# Patient Record
Sex: MALE
[De-identification: names, ages, dates, MRNs, and addresses within clinical notes are randomized per-mention and may not be internally consistent; named-entity substitution may affect disease eponyms.]

---

## 2019-10-08 PROBLEM — Z00.00 ENCOUNTER FOR PREVENTIVE HEALTH EXAMINATION: Status: ACTIVE | Noted: 2019-10-08

## 2019-10-09 ENCOUNTER — APPOINTMENT (OUTPATIENT)
Dept: ORTHOPEDIC SURGERY | Facility: CLINIC | Age: 64
End: 2019-10-09
Payer: COMMERCIAL

## 2019-10-09 DIAGNOSIS — M23.90 UNSPECIFIED INTERNAL DERANGEMENT OF UNSPECIFIED KNEE: ICD-10-CM

## 2019-10-09 PROCEDURE — 99203 OFFICE O/P NEW LOW 30 MIN: CPT

## 2019-10-09 PROCEDURE — 73562 X-RAY EXAM OF KNEE 3: CPT | Mod: LT

## 2019-10-09 NOTE — HISTORY OF PRESENT ILLNESS
[de-identified] : Patient reports that the LEFT knee has been bothering him for a bout 1 week now, atraumatic. He reports that his symptoms were made worse after a spin class. Dull aching pain. Pain is intermittent. Clicking.  States no significant improvement with rest and activity modification

## 2019-10-09 NOTE — ASSESSMENT
[FreeTextEntry1] : Patient has previously tried rest, activity modification, exercises, and medications (ie. anti-inflammatories, such as Ibuprofen or Tylenol) without improvement.  Patient has previously had x-ray evaluation.  Given persistent symptoms, a formal request is made for an MRI.\par

## 2019-10-09 NOTE — PHYSICAL EXAM
[de-identified] : Left knee\par \par Constitutional: \par The patient is healthy-appearing and in no apparent distress. \par \par Gait:\par The patient ambulates with a normal gait and no limp.\par \par Cardiovascular System: \par The capillary refill is less than 2 seconds. \par \par Skin: \par There are no skin abnormalities.\par \par Left Knee:\par  \par Bony Palpation: \par There is TENDERNESS of the medial joint line. \par There is no tenderness of the lateral joint line.\par There is no tenderness of the medial femoral chondyle.\par There is no tenderness of the lateral femoral chondyle.\par There is no tenderness of the tibial tubercle.\par There is no tenderness of the superior patella.\par There is no tenderness of the inferior patella.\par There is no tenderness of the medial patellar facet.\par There is no tenderness of the lateral patellar facet.\par \par Soft Tissue Palpation: \par There is no tenderness of the medial retinaculum.\par There is no tenderness of the lateral retinaculum.\par There is no tenderness of the quadriceps tendon.\par There is no tenderness of the patella tendon.\par There is no tenderness of the ITB.\par There is no tenderness of the pes anserine.\par \par Active Range of Motion: \par The range of motion at the knee actively and passively is full. \par \par Special Tests: \par There is a POSITIVE Apley.\par There is a POSITIVE Steinmanns. \par There is a negative Lachman and Anterior Drawer.\par There is a negative Posterior Drawer.  \par There is no varus or valgus laxity.\par \par Strength: \par There is 5/5 hip flexion and 5/5 knee flexion and extension.  \par \par Psychiatric: \par The patient demonstrates a normal mood and affect and is active and alert [de-identified] : X-ray left knee. There is no significant bone abnormality arthritis or fracture\par \par

## 2019-11-22 ENCOUNTER — APPOINTMENT (OUTPATIENT)
Dept: ORTHOPEDIC SURGERY | Facility: CLINIC | Age: 64
End: 2019-11-22
Payer: COMMERCIAL

## 2019-11-22 VITALS — BODY MASS INDEX: 25.92 KG/M2 | WEIGHT: 175 LBS | HEIGHT: 69 IN

## 2019-11-22 DIAGNOSIS — S59.912A UNSPECIFIED INJURY OF LEFT FOREARM, INITIAL ENCOUNTER: ICD-10-CM

## 2019-11-22 PROCEDURE — 99215 OFFICE O/P EST HI 40 MIN: CPT

## 2019-11-22 PROCEDURE — 73090 X-RAY EXAM OF FOREARM: CPT | Mod: LT

## 2019-11-22 NOTE — PHYSICAL EXAM
[de-identified] : General: No acute distress, conversant, well-nourished.\par Head: Normocephalic, atraumatic\par Neck: trachea midline, FROM\par Heart: normotensive and normal rate and rhythm\par Lungs: No labored breathing\par Skin: No abrasions, no rashes, no edema\par Psych: Alert and oriented to person, place and time\par Extremities: no peripheral edema or digital cyanosis\par Gait: Normal gait.   \par Vascular: warm and well perfused distally, palpable distal pulses\par \par MSK:\par Left Upper Extremity\par skin intact\par mild tenderness in dorsal proximal forearm\par compartments soft and compressible\par mild discomfort with ROM of elbow: flexion, extension, supination and pronation\par no block to motion\par SILT median/radial/ulnar distributions\par +Motor EPL/FPL/EDC/FDP/IO\par WWP distally [de-identified] : Left elbow obtained in the office today shows no acute fracture or dislocation.  \par Left forearm obtained in the office today shows no acute fracture or dislocation.  \par

## 2019-11-22 NOTE — ASSESSMENT
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  His radiographs showed no fracture or dislocation. He will ice and elevate the extremity.  He can take anti-inflammatories as needed for pain.  He knows to call with any questions or concerns or if his symptoms acutely worsen.

## 2019-11-22 NOTE — CONSULT LETTER
[Courtesy Letter:] : I had the pleasure of seeing your patient, [unfilled], in my office today. [Please see my note below.] : Please see my note below. [Sincerely,] : Sincerely, [FreeTextEntry3] : Rashid Spears MD\par [facility]\par [facility]\par Spine Surgery

## 2019-11-22 NOTE — HISTORY OF PRESENT ILLNESS
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  He has had mild pain in that area and wanted his injury evaluated.  He denies any numbness or weakness.  Isolated injury.

## 2020-02-26 ENCOUNTER — APPOINTMENT (OUTPATIENT)
Dept: ORTHOPEDIC SURGERY | Facility: CLINIC | Age: 65
End: 2020-02-26
Payer: COMMERCIAL

## 2020-02-26 PROCEDURE — 73562 X-RAY EXAM OF KNEE 3: CPT | Mod: RT

## 2020-02-26 PROCEDURE — 99213 OFFICE O/P EST LOW 20 MIN: CPT

## 2020-02-27 NOTE — ASSESSMENT
[FreeTextEntry1] : Discussed at length with patient underlying arthritis and treatment options.  Patient elects home exercises only at this time.  Offered cortisone injection but declined

## 2020-02-27 NOTE — PHYSICAL EXAM
[de-identified] : Right knee\par \par Constitutional: \par The patient is healthy-appearing and in no apparent distress. \par \par Gait:\par The patient ambulates with a normal gait and no limp.\par \par Cardiovascular System: \par The capillary refill is less than 2 seconds. \par \par Skin: \par There are no skin abnormalities.\par \par Right Knee:\par  \par Bony Palpation: \par There is tenderness of the medial joint line. \par There is no tenderness of the lateral joint line.\par There is tenderness of the medial femoral chondyle.\par There is no tenderness of the lateral femoral chondyle.\par There is no tenderness of the tibial tubercle.\par There is no tenderness of the superior patella.\par There is no tenderness of the inferior patella.\par There is tenderness of the medial patellar facet.\par There is tenderness of the lateral patellar facet.\par \par Soft Tissue Palpation: \par There is no tenderness of the medial retinaculum.\par There is no tenderness of the lateral retinaculum.\par There is no tenderness of the quadriceps tendon.\par There is no tenderness of the patella tendon.\par There is no tenderness of the ITB.\par There is no tenderness of the pes anserine.\par \par Active Range of Motion: \par The range of motion at the knee actively and passively is 3 - 125. \par \par Special Tests: \par There is a negative Apley.\par There is a negative Steinmanns. \par There is a negative Lachman and Anterior Drawer.\par There is a negative Posterior Drawer.  \par There is no varus or valgus laxity.\par \par Strength: \par There is 5/5 hip flexion and 5/5 knee flexion and extension.  \par \par Psychiatric: \par The patient demonstrates a normal mood and affect and is active and alert\par \par  [de-identified] : X-ray right knee.  There is severe medial and moderate to severe patellofemoral arthritis\par \par

## 2020-02-27 NOTE — HISTORY OF PRESENT ILLNESS
[de-identified] : Patient reports that the RIGHT knee has been bothering him for about 3 weeks now. He reports history of 2 separate arthroscopies on the knee. Mild swelling. Aching soreness. He reports knee began to bother him after having to climb 8 stories.

## 2020-03-04 ENCOUNTER — APPOINTMENT (OUTPATIENT)
Dept: ORTHOPEDIC SURGERY | Facility: CLINIC | Age: 65
End: 2020-03-04
Payer: COMMERCIAL

## 2020-03-04 DIAGNOSIS — M77.00 MEDIAL EPICONDYLITIS, UNSPECIFIED ELBOW: ICD-10-CM

## 2020-03-04 PROCEDURE — 20610 DRAIN/INJ JOINT/BURSA W/O US: CPT | Mod: RT

## 2020-03-04 PROCEDURE — 99214 OFFICE O/P EST MOD 30 MIN: CPT | Mod: 25

## 2020-03-05 PROBLEM — M77.00 MEDIAL EPICONDYLITIS: Status: ACTIVE | Noted: 2020-03-05

## 2020-03-05 NOTE — PHYSICAL EXAM
[de-identified] : Right knee\par \par Constitutional: \par The patient is healthy-appearing and in no apparent distress. \par \par Gait:\par The patient ambulates with a normal gait and no limp.\par \par Cardiovascular System: \par The capillary refill is less than 2 seconds. \par \par Skin: \par There are no skin abnormalities.\par \par Right Knee:\par  \par Bony Palpation: \par There is no tenderness of the medial joint line. \par There is no tenderness of the lateral joint line.\par There is tenderness of the medial femoral chondyle.\par There is no tenderness of the lateral femoral chondyle.\par There is no tenderness of the tibial tubercle.\par There is no tenderness of the superior patella.\par There is no tenderness of the inferior patella.\par There is tenderness of the medial patellar facet.\par There is tenderness of the lateral patellar facet.\par \par Soft Tissue Palpation: \par There is no tenderness of the medial retinaculum.\par There is no tenderness of the lateral retinaculum.\par There is no tenderness of the quadriceps tendon.\par There is no tenderness of the patella tendon.\par There is no tenderness of the ITB.\par There is no tenderness of the pes anserine.\par \par Active Range of Motion: \par The range of motion at the knee actively and passively is 0 - 125 with pain at end flexion. \par \par Special Tests: \par There is a negative Apley.\par There is a negative Steinmanns. \par There is a negative Lachman and Anterior Drawer.\par There is a negative Posterior Drawer.  \par There is no varus or valgus laxity.\par \par Strength: \par There is 5/5 hip flexion and 5/5 knee flexion and extension.  \par \par Right elbow\par Cardiovascular System: \par There is capillary refill less than 2 seconds. \par \par Skin: \par There is no skin abnormalities of elbow.\par \par Right Elbow: \par Appearance: \par There is no deformity, induration, redness, swelling, or warmth and a normal carrying angle. \par \par Bony Palpation: \par There is tenderness of the medial epicondyle.\par There is no tenderness of olecranon.\par There is no tenderness of the ulnatrochlea articulation.\par There is no tenderness of the coronoid process.\par There is no tenderness of the radial head.\par There is no tenderness of the radiocapitellar joint.\par There is no tenderness of the lateral epicondyle. \par \par Soft Tissue Palpation: \par There is no tenderness of the ulnar nerve.\par There is no tenderness of the biceps insertion.\par There is no tenderness of the pronator teres.\par There is tenderness of the flexor carpi ulnaris.\par There is no tenderness of the flexor carpi radialis.\par There is no tenderness of the annular ligament of the radius.\par There is no tenderness of the brachioradialis.\par There is no tenderness of the radial collateral ligament.\par There is no tenderness of the ulnar collateral ligament.\par There is no tenderness of the antecubital fossa.\par There is no tenderness of the extensor carpi radialis brevis.\par There is no tenderness of the extensor carpi radialis longus.\par \par Range of Motion:  \par There is full range of motion both actively and passively. \par \par Stability:\par There is no dislocation or laxity to testing.\par  \par Strength: \par There is 5/5 elbow flexion, extension, supination and pronation.  \par \par Neurologic:\par There is normal sensation C5-T1 to light touch. \par \par Psychiatric: \par The patient demonstrates a normal mood and affect and is active and alert.\par

## 2020-03-05 NOTE — PROCEDURE
[de-identified] : Patient has demonstrated limited relief from NSAIDS, rest, exercises / PT, and after discussion of the risks and benefits, the patient has elected to proceed with a corticosteroid injection into the RIGHT knee(s) via an Anterolateral site.\par Confirmed that the patient does not have history of prior adverse reactions, active, infections, or relevant allergies.   There was no erythema or warmth, and the skin was clear.  The skin was sterilized with alcohol and via sterile technique, the knee was injected 3 cc of 1% xylocaine with 5 mg Kenalog.  The injection was completed without complication and a bandage was applied.  The patient tolerated the procedure well and was given post-injection instructions.

## 2020-03-05 NOTE — ASSESSMENT
[FreeTextEntry1] : Discussed at length with patient exam history and imaging.  Patient like cortisone injection to right knee as well as home exercises and counterforce brace for the elbow\par \par

## 2020-03-05 NOTE — HISTORY OF PRESENT ILLNESS
[de-identified] : Patient is in for evaluation of his right knee and would like to consider cortisone injection given his underlying arthritis and no significant improvement with home exercises.  He also complains of right elbow medial discomfort over the last week which is mild and atraumatic

## 2020-08-04 ENCOUNTER — APPOINTMENT (OUTPATIENT)
Dept: ORTHOPEDIC SURGERY | Facility: CLINIC | Age: 65
End: 2020-08-04
Payer: COMMERCIAL

## 2020-08-04 VITALS — TEMPERATURE: 96 F

## 2020-08-04 DIAGNOSIS — M79.673 PAIN IN UNSPECIFIED FOOT: ICD-10-CM

## 2020-08-04 PROCEDURE — 99213 OFFICE O/P EST LOW 20 MIN: CPT

## 2020-08-04 PROCEDURE — 73630 X-RAY EXAM OF FOOT: CPT | Mod: RT

## 2020-08-04 RX ORDER — CELECOXIB 100 MG/1
100 CAPSULE ORAL TWICE DAILY
Qty: 30 | Refills: 2 | Status: ACTIVE | COMMUNITY
Start: 2020-08-04 | End: 1900-01-01

## 2020-08-04 NOTE — ASSESSMENT
[FreeTextEntry1] : Discussed at length with patient exam and imaging with differential including stress reaction versus tendinitis versus midfoot arthritis.  Given the tenderness to palpation and questionable lucency on x-ray we'll obtain an MRI of the foot.  Patient has previously tried rest, activity modification, exercises, and medications (ie. anti-inflammatories, such as Ibuprofen or Tylenol) without improvement.  Patient has previously had x-ray evaluation.  Given persistent symptoms, a formal request is made for an MRI.\par

## 2020-08-04 NOTE — HISTORY OF PRESENT ILLNESS
[de-identified] : Location: right dorsal midfoot\par Quality: aching\par Duration: 08/1/2020\par Context:  Atraumatic\par Aggravating Factors: prolonged walking or standing\par Conservative treatment:  Otc medication , ice\par Associated Symptoms:  Swelling\par Prior Studies: n.a\par

## 2020-08-04 NOTE — PHYSICAL EXAM
[de-identified] : X-ray RIGHT foot.  There is a questionable fracture lucency at the base of the third metatarsal with no displacement.  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 \par \par Gait and Station: \par The patient ambulates with a normal gait and no limp. \par \par Cardiovascular System: \par Ther capillary refill is less than 2 seconds. \par \par Skin: \par There are no skin abnormalities of ankle.\par \par Ankles and Feet: \par Inspection: \par There is no erythema.\par There is no induration.\par There is no warmth.\par There is no deformity.  \par There is minimal dorsal midfoot mildine swelling. \par \par Bony Palpation: \par There is no tenderness of the calcaneal tuberosity.\par There is mild tenderness of the base of the 3rd metatarsals.\par There is mild tenderness of the midline tarsometatarsal joints\par There is no tenderness of the navicular tuberosity. \par There is no tenderness of the dome of talus.\par There is no tenderness of the head of talus.\par There is no tenderness of the inferior tibiofibular joint.\par \par Soft Tissue Palpation: \par There is no tenderness of the tibialis posterior.\par There is no tenderness of the tibialis anterior. \par There is no tenderness of the plantar fascia.\par There is no tenderness of the Achilles tendon.\par There is no tenderness of the extensor hallucis longus.\par There is no tenderness of the sinus tarsi. \par There is no tenderness of the peroneus longus and brevis.\par There is no tenderness of the deltoid ligament.   \par There is no tenderness of the anterior talofibular ligament and the calcaneofibular ligament. \par \par Active Range of Motion: \par The range of motion at the ankle is full. \par \par Stability: \par The anterior drawer is negative. \par \par Strength: \par There is 5/5 ankle plantarflexion and dorsiflexion.\par \par Neurological System: \par There is normal sensation to light touch at the ankle and foot. \par \par Psychiatric: \par The patient demonstrates a normal mood and affect and is active and alert.\par \par \par

## 2020-08-11 ENCOUNTER — APPOINTMENT (OUTPATIENT)
Dept: ORTHOPEDIC SURGERY | Facility: CLINIC | Age: 65
End: 2020-08-11
Payer: COMMERCIAL

## 2020-08-11 DIAGNOSIS — M19.079 PRIMARY OSTEOARTHRITIS, UNSPECIFIED ANKLE AND FOOT: ICD-10-CM

## 2020-08-11 PROCEDURE — 99213 OFFICE O/P EST LOW 20 MIN: CPT

## 2020-08-11 NOTE — PHYSICAL EXAM
[de-identified] : Right foot:\par \par Constitutional: \par The patient is healthy-appearing and in no apparent distress. \par \par Gait and Station: \par The patient ambulates with a normal gait and no limp. \par \par Cardiovascular System: \par The capillary refill is less than 2 seconds. \par \par Skin: \par There are no skin abnormalities of ankle.\par \par Ankles and Feet: \par Inspection: \par There is no erythema.\par There is no induration.\par There is no warmth.\par There is no deformity.  \par There is minimal dorsal midfoot mildine swelling. \par \par Bony Palpation: \par There is no tenderness of the calcaneal tuberosity.\par There is mild tenderness of the base of the 3rd metatarsals.\par There is mild tenderness of the midline tarsometatarsal joints\par There is no tenderness of the navicular tuberosity. \par There is no tenderness of the dome of talus.\par There is no tenderness of the head of talus.\par There is no tenderness of the inferior tibiofibular joint.\par \par Soft Tissue Palpation: \par There is no tenderness of the tibialis posterior.\par There is no tenderness of the tibialis anterior. \par There is no tenderness of the plantar fascia.\par There is no tenderness of the Achilles tendon.\par There is no tenderness of the extensor hallucis longus.\par There is no tenderness of the sinus tarsi. \par There is no tenderness of the peroneus longus and brevis.\par There is no tenderness of the deltoid ligament.   \par There is no tenderness of the anterior talofibular ligament and the calcaneofibular ligament. \par \par Active Range of Motion: \par The range of motion at the ankle is full. \par \par Stability: \par The anterior drawer is negative. \par \par Strength: \par There is 5/5 ankle plantarflexion and dorsiflexion.\par \par Neurological System: \par There is normal sensation to light touch at the ankle and foot. \par \par Psychiatric: \par The patient demonstrates a normal mood and affect and is active and alert.\par \par \par  [de-identified] : MRI right foot.  There is midfoot arthritis with mild superficial swelling and cystic change.  There is no evidence of any stress fracture

## 2020-08-11 NOTE — HISTORY OF PRESENT ILLNESS
[de-identified] : \par Patient is an established patient seen one week ago with right foot discomfort and was referred for an MRI to evaluate for possible stress fracture of the third metatarsal.\par He states overall he is feeling better than he did last week with mild soreness but overall improving clinically

## 2020-08-11 NOTE — ASSESSMENT
[FreeTextEntry1] : Discussed at length with the patient MRI and exam and recommendation at this time for weightbearing as tolerated with a hard sole shoe for the next 2-3 weeks.

## 2021-08-31 ENCOUNTER — APPOINTMENT (OUTPATIENT)
Dept: ORTHOPEDIC SURGERY | Facility: CLINIC | Age: 66
End: 2021-08-31
Payer: COMMERCIAL

## 2021-08-31 DIAGNOSIS — M25.561 PAIN IN RIGHT KNEE: ICD-10-CM

## 2021-08-31 PROCEDURE — 73590 X-RAY EXAM OF LOWER LEG: CPT | Mod: RT

## 2021-08-31 PROCEDURE — 73562 X-RAY EXAM OF KNEE 3: CPT | Mod: RT

## 2021-08-31 PROCEDURE — 20610 DRAIN/INJ JOINT/BURSA W/O US: CPT | Mod: RT

## 2021-08-31 PROCEDURE — 99214 OFFICE O/P EST MOD 30 MIN: CPT | Mod: 25

## 2021-08-31 RX ORDER — CELECOXIB 100 MG/1
100 CAPSULE ORAL TWICE DAILY
Qty: 60 | Refills: 2 | Status: ACTIVE | COMMUNITY
Start: 2021-08-31 | End: 1900-01-01

## 2021-08-31 NOTE — PHYSICAL EXAM
[de-identified] : Right knee\par \par Constitutional: \par The patient is healthy-appearing and in no apparent distress. \par \par Gait:\par The patient ambulates with a normal gait and no limp.\par \par Cardiovascular System: \par The capillary refill is less than 2 seconds. \par \par Skin: \par There are no skin abnormalities.\par \par Right Knee:\par  \par Bony Palpation: \par There is tenderness of the medial joint line. \par There is no tenderness of the lateral joint line.\par There is tenderness of the medial femoral chondyle.\par There is no tenderness of the lateral femoral chondyle.\par There is no tenderness of the tibial tubercle.\par There is no tenderness of the superior patella.\par There is no tenderness of the inferior patella.\par There is  tenderness of the medial patellar facet.\par There is no tenderness of the lateral patellar facet.\par \par Soft Tissue Palpation: \par There is no tenderness of the medial retinaculum.\par There is no tenderness of the lateral retinaculum.\par There is no tenderness of the quadriceps tendon.\par There is no tenderness of the patella tendon.\par There is no tenderness of the ITB.\par There is no tenderness of the pes anserine.\par \par Active Range of Motion: \par The range of motion at the knee actively and passively is full. \par \par Special Tests: \par There is a negative Apley.\par There is a negative Steinmanns. \par There is a negative Lachman and Anterior Drawer.\par There is a negative Posterior Drawer.  \par There is no varus or valgus laxity.\par \par Strength: \par There is 5/5 hip flexion and 5/5 knee flexion and extension.  \par \par Psychiatric: \par The patient demonstrates a normal mood and affect and is active and alert\par  [de-identified] : X-ray right knee.  There is severe medial and moderate patellofemoral arthritis\par \par X-ray tib-fib.  As above in regards to knee otherwise no significant bony abnormality

## 2021-08-31 NOTE — HISTORY OF PRESENT ILLNESS
[de-identified] : Location: Right knee, right shin (lateral)\par Duration: 2-3 days (shin), 1 week (knee)\par Context: atraumatic\par Quality: sore, dull\par Aggravating factors: walking, stairs\par Associated symptoms: knee swelling\par Conservative treatment: ice, Celebrex\par Prior studies: N/A

## 2021-08-31 NOTE — PROCEDURE
[de-identified] : Patient has demonstrated limited relief from NSAIDS, rest, exercises / PT, and after discussion of the risks and benefits, the patient has elected to proceed with a corticosteroid injection into the RIGHT knee via an Anterolateral site.\par Confirmed that the patient does not have history of prior adverse reactions, active, infections, or relevant allergies.   There was no erythema or warmth, and the skin was clear.  The skin was sterilized with alcohol and via sterile technique, the knee was injected 3 cc of 1% xylocaine with 40 mg Kenalog.  The injection was completed without complication and a bandage was applied.  The patient tolerated the procedure well and was given post-injection instructions.\par

## 2021-08-31 NOTE — ASSESSMENT
[FreeTextEntry1] : Discussed at length with patient exam history and imaging as well as treatment options.  At this time patient elects cortisone injection observation and prescription anti-inflammatory.  If no improvement patient follow up in office\par

## 2021-09-07 ENCOUNTER — APPOINTMENT (OUTPATIENT)
Dept: ORTHOPEDIC SURGERY | Facility: CLINIC | Age: 66
End: 2021-09-07
Payer: COMMERCIAL

## 2021-09-07 DIAGNOSIS — M79.661 PAIN IN RIGHT LOWER LEG: ICD-10-CM

## 2021-09-07 PROCEDURE — 99213 OFFICE O/P EST LOW 20 MIN: CPT

## 2021-09-08 NOTE — PHYSICAL EXAM
[de-identified] : Right knee\par \par Constitutional: \par The patient is healthy-appearing and in no apparent distress. \par \par Gait:\par The patient ambulates with a normal gait and no limp.\par \par Cardiovascular System: \par The capillary refill is less than 2 seconds. \par \par Skin: \par There are no skin abnormalities.\par \par Right Knee:\par  \par Bony Palpation: \par There is tenderness of the medial joint line. \par There is no tenderness of the lateral joint line.\par There is tenderness of the medial femoral chondyle.\par There is no tenderness of the lateral femoral chondyle.\par There is no tenderness of the tibial tubercle.\par There is no tenderness of the superior patella.\par There is no tenderness of the inferior patella.\par There is  tenderness of the medial patellar facet.\par There is no tenderness of the lateral patellar facet.\par \par Soft Tissue Palpation: \par There is no tenderness of the medial retinaculum.\par There is no tenderness of the lateral retinaculum.\par There is no tenderness of the quadriceps tendon.\par There is no tenderness of the patella tendon.\par There is no tenderness of the ITB.\par There is no tenderness of the pes anserine.\par \par Active Range of Motion: \par The range of motion at the knee actively and passively is full. \par \par Special Tests: \par There is a negative Apley.\par There is a negative Steinmanns. \par There is a negative Lachman and Anterior Drawer.\par There is a negative Posterior Drawer.  \par There is no varus or valgus laxity.\par \par Strength: \par There is 5/5 hip flexion and 5/5 knee flexion and extension.  \par \par Psychiatric: \par The patient demonstrates a normal mood and affect and is active and alert\par

## 2021-09-08 NOTE — HISTORY OF PRESENT ILLNESS
[de-identified] : Patient is an established patient seen one week ago and states overall moderate improvement after the injection in his knee in regards to his knee discomfort but he is still having some discomfort in the proximal leg.  He states he has improved and is able to work on the elliptical without pain but still has some discomfort and would like further evaluation

## 2021-09-08 NOTE — ASSESSMENT
[FreeTextEntry1] : Discussed at length with patient and persistent discomfort and concerns at this time we'll order an MRI for further evaluation.  Patient is agreeable with plan

## 2021-09-27 ENCOUNTER — APPOINTMENT (OUTPATIENT)
Dept: ORTHOPEDIC SURGERY | Facility: CLINIC | Age: 66
End: 2021-09-27
Payer: COMMERCIAL

## 2021-09-27 PROCEDURE — 99213 OFFICE O/P EST LOW 20 MIN: CPT

## 2021-09-27 NOTE — PHYSICAL EXAM
[de-identified] : Right Shoulder:\par Constitutional:\par The patient is healthy-appearing and in no apparent distress. \par \par Cardiovascular System: \par The capillary refill is less than 2 seconds. \par \par Skin: \par There are no skin abnormalities.\par \par C-Spine/Neck:\par \par Active Range of Motion:\par Flexion				50\par Extension			60\par Lateral rotation			80  \par \par Right Shoulder: \par Inspection: \par There is no atrophy, erythema, warmth, swelling.\par There is no scapular winging.\par There is no AC prominence. \par \par Bony Palpation: \par There is no tenderness of the clavicle.\par There is no tenderness of the acromioclavicular joint.\par There is no tenderness of the greater tuberosity. \par There is no tenderness of the bicipital groove.\par  \par Soft Tissue Palpation: \par There is no tenderness of the trapezius.\par There is no tenderness of the rhomboid.\par There is no tenderness of the subacromial bursa. \par \par Active Range of Motion: \par Forward flexion- 				180 \par Abduction-					120\par External rotation at 0 degrees abduction-	80 \par Internal rotation at 0 degrees abduction-	80\par \par Passive Range of Motion: \par Forward flexion- 			180 \par Abduction-				120\par External rotation at 0 deg abduction-	80 \par Internal rotation at 0 deg abduction-	80\par \par Special Tests: \par Hawkin's  				Positive \par Neer's  				Positive \par Speed's  				Negative\par AC cross-over 			            Negative\par Cameron's  				Negative\par \par Stability: \par There is no general laxity. \par There is no anterior apprehension.\par \par Strength: \par Supraspinatus abduction 		5/5\par External rotation at 0 deg abduction 	5/5\par Internal rotation at 0 deg abduction	5/5\par Scapular elevation 			5/5 \par \par Neurological System: \par \par There is normal sensation to light touch C5-T1. \par \par Stability: \par There is no general laxity. \par \par Psychiatric: \par The patient demonstrates a normal mood and affect and is active and alert. [de-identified] : X-ray right shoulder ( brought by patient 9-24-21 ): There is no significant bony / soft tissue abnormality, arthritis, or fracture.

## 2021-09-27 NOTE — ASSESSMENT
[FreeTextEntry1] : Discussed at length with patient exam history and imaging as well as treatment options.  Patient this time elects physical therapy as well as home exercises.  Offer cortisone injection but he declines at this time.  If no improvement consideration to injection and ultimately MRI evaluation\par

## 2021-09-27 NOTE — HISTORY OF PRESENT ILLNESS
[de-identified] : Location: Right shoulder- lateral\par Duration: 1 week- "improving"\par Context: atraumatic - started after swimming a lot\par Quality: soreness\par Aggravating factors: lifting, swimming\par Conservative treatment: ice, NSAID, rest\par Prior studies: X-rays right shoulder 9/24/21\par Patient states right leg discomfort is persistent but overall improving and he is still able to do activities in regards to working out.

## 2021-10-05 DIAGNOSIS — M79.606 PAIN IN LEG, UNSPECIFIED: ICD-10-CM

## 2021-10-06 ENCOUNTER — APPOINTMENT (OUTPATIENT)
Dept: ORTHOPEDIC SURGERY | Facility: CLINIC | Age: 66
End: 2021-10-06
Payer: COMMERCIAL

## 2021-10-06 PROCEDURE — 72110 X-RAY EXAM L-2 SPINE 4/>VWS: CPT

## 2021-10-06 PROCEDURE — 99214 OFFICE O/P EST MOD 30 MIN: CPT

## 2021-10-06 RX ORDER — METHYLPREDNISOLONE 4 MG/1
4 TABLET ORAL
Qty: 1 | Refills: 0 | Status: ACTIVE | COMMUNITY
Start: 2021-10-06 | End: 1900-01-01

## 2021-10-20 ENCOUNTER — APPOINTMENT (OUTPATIENT)
Dept: ORTHOPEDIC SURGERY | Facility: CLINIC | Age: 66
End: 2021-10-20
Payer: COMMERCIAL

## 2021-10-20 DIAGNOSIS — M25.562 PAIN IN LEFT KNEE: ICD-10-CM

## 2021-10-20 PROCEDURE — 99213 OFFICE O/P EST LOW 20 MIN: CPT

## 2021-10-20 PROCEDURE — 73562 X-RAY EXAM OF KNEE 3: CPT | Mod: LT

## 2021-10-20 NOTE — PHYSICAL EXAM
[de-identified] : Right Shoulder:\par \par Constitutional:\par The patient is healthy-appearing and in no apparent distress. \par \par Cardiovascular System: \par The capillary refill is less than 2 seconds. \par \par Skin: \par There are no skin abnormalities.\par \par Right Shoulder: \par Inspection: \par There is no atrophy, erythema, warmth, swelling.\par There is no scapular winging.\par There is no AC prominence. \par \par Bony Palpation: \par There is no tenderness of the clavicle.\par There is no tenderness of the acromioclavicular joint.\par There is no tenderness of the greater tuberosity. \par There is no tenderness of the bicipital groove.\par  \par Soft Tissue Palpation: \par There is no tenderness of the trapezius.\par There is no tenderness of the rhomboid.\par There is no tenderness of the subacromial bursa. \par \par Active Range of Motion: \par Forward flexion- 				180 \par Abduction-					120\par External rotation at 0 degrees abduction-	80 \par Internal rotation at 0 degrees abduction-	80\par \par Passive Range of Motion: \par Forward flexion- 			180 \par Abduction-				120\par External rotation at 0 deg abduction-	80 \par Internal rotation at 0 deg abduction-	80\par \par Special Tests: \par Hawkin's  				Positive \par Neer's  				Positive \par Speed's  				Negative\par AC cross-over 			            Negative\par [place]'s  				Negative\par \par Stability: \par There is no general laxity. \par There is no anterior apprehension.\par \par Strength: \par Supraspinatus abduction 		5/5\par External rotation at 0 deg abduction 	5/5\par Internal rotation at 0 deg abduction	5/5\par Scapular elevation 			5/5 \par \par Neurological System: \par \par There is normal sensation to light touch C5-T1. \par \par Stability: \par There is no general laxity. \par \par Psychiatric: \par The patient demonstrates a normal mood and affect and is active and alert.\par \par Left knee\par \par Gait:\par The patient ambulates with a normal gait and no significant limp.\par \par Cardiovascular System: \par The capillary refill is less than 2 seconds. \par \par Skin: \par There are no skin abnormalities.\par \par Left Knee:\par  \par Bony Palpation: \par There is tenderness of the medial joint line. \par There is no tenderness of the lateral joint line.\par There is no tenderness of the medial femoral chondyle.\par There is no tenderness of the lateral femoral chondyle.\par There is no tenderness of the tibial tubercle.\par There is no tenderness of the superior patella.\par There is no tenderness of the inferior patella.\par There is no tenderness of the medial patellar facet.\par There is no tenderness of the lateral patellar facet.\par \par Soft Tissue Palpation: \par There is no tenderness of the medial retinaculum.\par There is no tenderness of the lateral retinaculum.\par There is no tenderness of the quadriceps tendon.\par There is no tenderness of the patella tendon.\par There is no tenderness of the ITB.\par There is no tenderness of the pes anserine.\par \par Active Range of Motion: \par The range of motion at the knee actively and passively is full. \par \par Special Tests: \par There is a negative Apley.\par There is a negative Steinmanns. \par There is a negative Lachman and Anterior Drawer.\par There is a negative Posterior Drawer.  \par There is no varus or valgus laxity.\par \par Strength: \par There is 5/5 hip flexion and 5/5 knee flexion and extension.  \par \par Psychiatric: \par The patient demonstrates a normal mood and affect and is active and alert\par  [de-identified] : Given patient's reported history and physical examination, x-ray evaluation ( as listed below ) was ordered and performed to aid in diagnosis and treatment of the patient.\par X-ray left knee.  There is no significant bony / soft tissue abnormality, arthritis, or fracture.\par

## 2021-10-20 NOTE — ASSESSMENT
[FreeTextEntry1] : Discussed at length with patient exam history and imaging as well as treatment options particularly with the shoulder in offer cortisone injection.  Patient states he would like to do home exercise at this time and if no improvement 3 weeks consideration of injection of the shoulder.  Persistent discomfort consideration MRI for possible need for surgical treatment.  In regards to his knee recommend consideration to MRI evaluation given pinpoint tenderness at the joint line.  Patient's time elects observation and if no improvement in the ensuing weeks will call for MRI

## 2021-10-20 NOTE — HISTORY OF PRESENT ILLNESS
[de-identified] : Patient is an established patient presenting for followup in regards to his right shoulder which he states overall is significantly improved but still has some baseline discomfort.  He is going to physical therapy twice a week but states is not significantly doing any home exercises which were given prior.  He states he is having some left knee discomfort over the last week and denies any specific fall or trauma he localizes the pain to the medial aspect of his knee.  He denies any instability\par

## 2021-10-21 ENCOUNTER — APPOINTMENT (OUTPATIENT)
Dept: ORTHOPEDIC SURGERY | Facility: CLINIC | Age: 66
End: 2021-10-21
Payer: COMMERCIAL

## 2021-10-21 PROCEDURE — 99214 OFFICE O/P EST MOD 30 MIN: CPT

## 2021-11-08 NOTE — PHYSICAL EXAM
[de-identified] : General: No acute distress, conversant, well-nourished.\par Head: Normocephalic, atraumatic\par Neck: trachea midline, FROM\par Heart: normotensive and normal rate and rhythm\par Lungs: No labored breathing\par Skin: No abrasions, no rashes, no edema\par Psych: Alert and oriented to person, place and time\par Extremities: no peripheral edema or digital cyanosis\par Gait: Normal gait. Can perform tandem gait.  \par Vascular: warm and well perfused distally, palpable distal pulses\par \par MSK:\par Lumbar spine:\par No tenderness to palpation.  No step-off, no deformity.\par \par NEURO EXAM:\par Sensation \par Left L2  -  2/2            \par Left L3  -  2/2\par Left L4  -  2/2\par Left L5  -  2/2\par Left S1  -  2/2\par \par Right L2  -  2/2            \par Right L3  -  2/2\par Right L4  -  2/2\par Right L5  -  2/2\par Right S1  -  2/2\par \par Motor: \par Left L2 (hip flexion)                            5/5                \par Left L3 (knee extension)                   5/5                \par Left L4 (ankle dorsiflexion)                 5/5                \par Left L5 (long toe extensor)                5/5                \par Left S1 (ankle plantar flexion)           5/5\par \par Right L2 (hip flexion)                            5/5                \par Right L3 (knee extension)                   5/5                \par Right L4 (ankle dorsiflexion)                 5/5                \par Right L5 (long toe extensor)                5/5                \par Right S1 (ankle plantar flexion)           5/5\par \par Reflexes: Normal and symmetric\par Negative clonus.  Down-going Babinski.   [de-identified] : Lumbar MRI (10/21/21): 1. Compared to 8/5/2019, interval progression of multilevel degenerative changes of the lumbar spine.\par 2. Interval progression of grade 1 anterolisthesis of L3 on L4 and L4 on L5.\par 3. Moderate spinal canal stenosis at L3-4 and L4-5, mild at L1-2 and L2-3.\par 4. Severe right and moderate left foraminal stenosis at L4-5, moderate to severe bilaterally at L3-4, and mild to moderate bilaterally at L2-3 and on the left at L1-2.\par 5. Mild dextroscoliosis.\par 6. Moderate to severe lower lumbar facet arthrosis.\par \par Lumbar 4 view radiographs (10/6/21) no dislocation or fracture.  Lumbar spondylosis.  Degenerative scoliosis.  L4-L5 anterolisthesis with minimal movement on dynamic series.

## 2021-11-08 NOTE — ASSESSMENT
[FreeTextEntry1] : 66 year old male presents with acute exacerbation of chronic back pain radiating to his right leg.  He is otherwise neurologically intact.  He was given a referral for a lumbar MRI.  He will continue Celebrex as needed. He was given a referral for physical therapy.  He will followup once his MRI is complete.  We discussed red flag symptoms that would require emergent evaluation. He knows to call with any questions or concerns or if his symptoms acutely worsen.

## 2021-11-08 NOTE — HISTORY OF PRESENT ILLNESS
[de-identified] : 66 year old male presents with acute exacerbation of chronic back pain radiating to his right leg.  He denies injury.  The pain is mild but he calls it "demoralizing."  He takes Celebrex with some relief.  He denies recent illness, fevers, numbness, weakness, balance problems, saddle anesthesia, urinary retention or fecal incontinence.\par

## 2021-11-08 NOTE — HISTORY OF PRESENT ILLNESS
[de-identified] : 66 year old male followup with acute exacerbation of chronic back pain radiating to his right leg. His pain has remained unchanged since his last appointment.  He takes Celebrex with some relief.  He denies recent illness, fevers, numbness, weakness, balance problems, saddle anesthesia, urinary retention or fecal incontinence. He reports the pain as mild.  He is here to review his MRI.  \par

## 2021-11-08 NOTE — PHYSICAL EXAM
[de-identified] : General: No acute distress, conversant, well-nourished.\par Head: Normocephalic, atraumatic\par Neck: trachea midline, FROM\par Heart: normotensive and normal rate and rhythm\par Lungs: No labored breathing\par Skin: No abrasions, no rashes, no edema\par Psych: Alert and oriented to person, place and time\par Extremities: no peripheral edema or digital cyanosis\par Gait: Normal gait. Can perform tandem gait.  \par Vascular: warm and well perfused distally, palpable distal pulses\par \par MSK:\par Lumbar spine:\par No tenderness to palpation.  No step-off, no deformity.\par \par NEURO EXAM:\par Sensation \par Left L2  -  2/2            \par Left L3  -  2/2\par Left L4  -  2/2\par Left L5  -  2/2\par Left S1  -  2/2\par \par Right L2  -  2/2            \par Right L3  -  2/2\par Right L4  -  2/2\par Right L5  -  2/2\par Right S1  -  2/2\par \par Motor: \par Left L2 (hip flexion)                            5/5                \par Left L3 (knee extension)                   5/5                \par Left L4 (ankle dorsiflexion)                 5/5                \par Left L5 (long toe extensor)                5/5                \par Left S1 (ankle plantar flexion)           5/5\par \par Right L2 (hip flexion)                            5/5                \par Right L3 (knee extension)                   5/5                \par Right L4 (ankle dorsiflexion)                 5/5                \par Right L5 (long toe extensor)                5/5                \par Right S1 (ankle plantar flexion)           5/5\par \par Reflexes: Normal and symmetric\par Negative clonus.  Down-going Babinski.   [de-identified] : I ordered radiographs to evaluate the patient's symptoms.\par \par Lumbar 4 view radiographs taken in the office today show no dislocation or fracture.  Lumbar spondylosis.  Degenerative scoliosis.  L4-L5 anterolisthesis with minimal movement on dynamic series.

## 2021-11-08 NOTE — ASSESSMENT
[FreeTextEntry1] : 66 year old male followup with acute exacerbation of chronic back pain radiating to his right leg. His pain has remained unchanged since his last appointment.  He takes Celebrex with some relief.  He is otherwise neurologically intact.  We reviewed his imaging which shows degenerative changes, scoliosis and spondylolisthesis.  We discussed treatment options including physical therapy, medications, spinal injections and surgery.  The patient says the pain is mild and will continue conservative treatment. He will continue Celebrex.  He will do PT. He will followup in 6-8 weeks. We discussed red flag symptoms that would require emergent evaluation. He knows to call with any questions or concerns or if his symptoms acutely worsen.

## 2021-11-16 ENCOUNTER — APPOINTMENT (OUTPATIENT)
Dept: ORTHOPEDIC SURGERY | Facility: CLINIC | Age: 66
End: 2021-11-16
Payer: COMMERCIAL

## 2021-11-16 PROCEDURE — 99213 OFFICE O/P EST LOW 20 MIN: CPT

## 2021-11-16 NOTE — HISTORY OF PRESENT ILLNESS
[de-identified] : Patient is an established patient last seen by me in October with right shoulder tendinitis bursitis diagnosis.  At the time he was recommended for home exercises and offered physical therapy as well as cortisone injection.  Patient declined any injection.  He states he did seek a second opinion several days after and at that time had a cortisone injection which she states helped temporarily and mildly for a few days.  He states persistent pain and symptoms.

## 2021-11-16 NOTE — PHYSICAL EXAM
[de-identified] : Right Shoulder:\par \par Constitutional:\par The patient is healthy-appearing and in no apparent distress. \par \par Cardiovascular System: \par The capillary refill is less than 2 seconds. \par \par Skin: \par There are no skin abnormalities.\par \par Right Shoulder: \par Inspection: \par There is no atrophy, erythema, warmth, swelling.\par There is no scapular winging.\par There is no AC prominence. \par \par Bony Palpation: \par There is no tenderness of the clavicle.\par There is no tenderness of the acromioclavicular joint.\par There is no tenderness of the greater tuberosity. \par There is no tenderness of the bicipital groove.\par  \par Soft Tissue Palpation: \par There is no tenderness of the trapezius.\par There is no tenderness of the rhomboid.\par There is no tenderness of the subacromial bursa. \par \par Active Range of Motion: \par Forward flexion- 				180 \par Abduction-					120\par External rotation at 0 degrees abduction-	80 \par Internal rotation at 0 degrees abduction-	80\par \par Passive Range of Motion: \par Forward flexion- 			180 \par Abduction-				120\par External rotation at 0 deg abduction-	80 \par Internal rotation at 0 deg abduction-	80\par \par Special Tests: \par Hawkin's  				Positive \par Neer's  				Positive \par Speed's  				Negative\par AC cross-over 			            Negative\par [place]'s  				Negative\par \par Stability: \par There is no general laxity. \par There is no anterior apprehension.\par \par Strength: \par Supraspinatus abduction 		5/5\par External rotation at 0 deg abduction 	5/5\par Internal rotation at 0 deg abduction	5/5\par Scapular elevation 			5/5 \par \par Neurological System: \par \par There is normal sensation to light touch C5-T1. \par \par Stability: \par There is no general laxity. \par \par Psychiatric: \par The patient demonstrates a normal mood and affect and is active and alert.\par \par Left knee\par \par Gait:\par The patient ambulates with a normal gait and no significant limp.\par \par Cardiovascular System: \par The capillary refill is less than 2 seconds. \par \par Skin: \par There are no skin abnormalities.\par \par Left Knee:\par  \par Bony Palpation: \par There is tenderness of the medial joint line. \par There is no tenderness of the lateral joint line.\par There is no tenderness of the medial femoral chondyle.\par There is no tenderness of the lateral femoral chondyle.\par There is no tenderness of the tibial tubercle.\par There is no tenderness of the superior patella.\par There is no tenderness of the inferior patella.\par There is no tenderness of the medial patellar facet.\par There is no tenderness of the lateral patellar facet.\par \par Soft Tissue Palpation: \par There is no tenderness of the medial retinaculum.\par There is no tenderness of the lateral retinaculum.\par There is no tenderness of the quadriceps tendon.\par There is no tenderness of the patella tendon.\par There is no tenderness of the ITB.\par There is no tenderness of the pes anserine.\par \par Active Range of Motion: \par The range of motion at the knee actively and passively is full. \par \par Special Tests: \par There is a negative Apley.\par There is a negative Steinmanns. \par There is a negative Lachman and Anterior Drawer.\par There is a negative Posterior Drawer.  \par There is no varus or valgus laxity.\par \par Strength: \par There is 5/5 hip flexion and 5/5 knee flexion and extension.  \par \par Psychiatric: \par The patient demonstrates a normal mood and affect and is active and alert\par

## 2021-11-16 NOTE — ASSESSMENT
[FreeTextEntry1] : Lengthy discussion with the patient reversed prior imaging evaluation and recommended treatment options.  Discussed with the patient that given that I did not perform the injection of cortisone and I cannot speak directly to its placement and efficacy.  Reviewed with the patient that if he did not improve with home exercises and time per our prior conversations and I would've recommended a cortisone shot as I had at his initial evaluation.  Treatment options reviewed at this time and patient would like to proceed with MRI evaluation.

## 2021-11-17 ENCOUNTER — APPOINTMENT (OUTPATIENT)
Dept: ORTHOPEDIC SURGERY | Facility: CLINIC | Age: 66
End: 2021-11-17

## 2021-11-30 ENCOUNTER — APPOINTMENT (OUTPATIENT)
Dept: ORTHOPEDIC SURGERY | Facility: CLINIC | Age: 66
End: 2021-11-30
Payer: COMMERCIAL

## 2021-11-30 PROCEDURE — 20610 DRAIN/INJ JOINT/BURSA W/O US: CPT | Mod: RT

## 2021-11-30 PROCEDURE — 99213 OFFICE O/P EST LOW 20 MIN: CPT | Mod: 25

## 2021-11-30 NOTE — ASSESSMENT
[FreeTextEntry1] : Discussed at length with patient exam history and imaging as well as treatment options.  Recommendation this time for a repeat cortisone injection which was given without issue and new PT prescription as well as continue home exercises.  If no improvement and over the next week and a half to 2 weeks given prior history of multiple injections PT in time I would recommend arthroscopy with subacromial decompression\par

## 2021-11-30 NOTE — PROCEDURE
[de-identified] : Patient has demonstrated limited relief from NSAIDS, rest, exercises / PT, and after discussion of the risks and benefits, the patient has elected to proceed with a corticosteroid injection into the RIGHT shoulder via Posterolateral site.\par Confirmed that the patient does not have history of prior adverse reactions, active, infections, or relevant allergies.   There was no erythema or warmth, and the skin was clear.  The skin was sterilized with alcohol and via sterile technique, the shoulder was injected with 3 cc of 1% xylocaine and 40 mg of Kenalog.  The injection was completed without complication and a bandage was applied.  The patient tolerated the procedure well and was given post-injection instructions.

## 2021-11-30 NOTE — PHYSICAL EXAM
[de-identified] : Right Shoulder:\par \par Constitutional:\par The patient is healthy-appearing and in no apparent distress. \par \par Cardiovascular System: \par The capillary refill is less than 2 seconds. \par \par Skin: \par There are no skin abnormalities.\par \par Right Shoulder: \par Inspection: \par There is no atrophy, erythema, warmth, swelling.\par There is no scapular winging.\par There is no AC prominence. \par \par Bony Palpation: \par There is no tenderness of the clavicle.\par There is no tenderness of the acromioclavicular joint.\par There is no tenderness of the greater tuberosity. \par There is no tenderness of the bicipital groove.\par  \par Soft Tissue Palpation: \par There is no tenderness of the trapezius.\par There is no tenderness of the rhomboid.\par There is no tenderness of the subacromial bursa. \par \par Active Range of Motion: \par Forward flexion- 				180 \par Abduction-					120\par External rotation at 0 degrees abduction-	80 \par Internal rotation at 0 degrees abduction-	80\par \par Passive Range of Motion: \par Forward flexion- 			180 \par Abduction-				120\par External rotation at 0 deg abduction-	80 \par Internal rotation at 0 deg abduction-	80\par \par Special Tests: \par Hawkin's  				Positive \par Neer's  				Positive \par Speed's  				Negative\par AC cross-over 			            Negative\par [place]ampton's  				Negative\par \par Stability: \par There is no general laxity. \par There is no anterior apprehension.\par \par Strength: \par Supraspinatus abduction 		5/5\par External rotation at 0 deg abduction 	5/5\par Internal rotation at 0 deg abduction	5/5\par Scapular elevation 			5/5 \par \par Neurological System: \par \par There is normal sensation to light touch C5-T1. \par \par Stability: \par There is no general laxity. \par \par Psychiatric: \par The patient demonstrates a normal mood and affect and is active and alert.\par \par Left knee\par \par Gait:\par The patient ambulates with a normal gait and no significant limp.\par \par Cardiovascular System: \par The capillary refill is less than 2 seconds. \par \par Skin: \par There are no skin abnormalities.\par \par Left Knee:\par  \par Bony Palpation: \par There is tenderness of the medial joint line. \par There is no tenderness of the lateral joint line.\par There is no tenderness of the medial femoral chondyle.\par There is no tenderness of the lateral femoral chondyle.\par There is no tenderness of the tibial tubercle.\par There is no tenderness of the superior patella.\par There is no tenderness of the inferior patella.\par There is no tenderness of the medial patellar facet.\par There is no tenderness of the lateral patellar facet.\par \par Soft Tissue Palpation: \par There is no tenderness of the medial retinaculum.\par There is no tenderness of the lateral retinaculum.\par There is no tenderness of the quadriceps tendon.\par There is no tenderness of the patella tendon.\par There is no tenderness of the ITB.\par There is no tenderness of the pes anserine.\par \par Active Range of Motion: \par The range of motion at the knee actively and passively is full. \par \par Special Tests: \par There is a negative Apley.\par There is a negative Steinmanns. \par There is a negative Lachman and Anterior Drawer.\par There is a negative Posterior Drawer.  \par There is no varus or valgus laxity.\par \par Strength: \par There is 5/5 hip flexion and 5/5 knee flexion and extension.  \par \par Psychiatric: \par The patient demonstrates a normal mood and affect and is active and alert\par  [de-identified] : MRI right shoulder ( [facility] ):  There is supraspinatus tendinosis as well subacromial bursitis and ice mildly narrowed acromial humeral distance.  There was mild fraying question small SLAP 2 tear and a.c. joint arthritis

## 2021-11-30 NOTE — HISTORY OF PRESENT ILLNESS
[de-identified] : Patient is an established patient presenting for followup evaluation and discussion regarding his MRI.  States persistent shoulder pain at the anterolateral aspect of the shoulder

## 2022-02-18 ENCOUNTER — APPOINTMENT (OUTPATIENT)
Dept: ORTHOPEDIC SURGERY | Facility: CLINIC | Age: 67
End: 2022-02-18
Payer: COMMERCIAL

## 2022-02-18 DIAGNOSIS — M43.16 SPONDYLOLISTHESIS, LUMBAR REGION: ICD-10-CM

## 2022-02-18 DIAGNOSIS — M41.80 OTHER FORMS OF SCOLIOSIS, SITE UNSPECIFIED: ICD-10-CM

## 2022-02-18 PROCEDURE — 99214 OFFICE O/P EST MOD 30 MIN: CPT

## 2022-02-18 RX ORDER — METHYLPREDNISOLONE 4 MG/1
4 TABLET ORAL
Qty: 1 | Refills: 0 | Status: ACTIVE | COMMUNITY
Start: 2022-02-18 | End: 1900-01-01

## 2022-02-18 NOTE — HISTORY OF PRESENT ILLNESS
[de-identified] : 66 year old male followup with acute exacerbation of chronic back pain radiating to his right leg. Since his last visit he reports he was doing well until a few days ago when he a return of his severe back pain radiating to his legs.  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 He went to his chiropractor and now presents to my office.  He denies recent illness, fevers, numbness, weakness, balance problems, saddle anesthesia, urinary retention or fecal incontinence.

## 2022-02-18 NOTE — PHYSICAL EXAM
[de-identified] : General: No acute distress, conversant, well-nourished.\par Head: Normocephalic, atraumatic\par Neck: trachea midline, FROM\par Heart: normotensive and normal rate and rhythm\par Lungs: No labored breathing\par Skin: No abrasions, no rashes, no edema\par Psych: Alert and oriented to person, place and time\par Extremities: no peripheral edema or digital cyanosis\par Gait: Normal gait. Can perform tandem gait.  \par Vascular: warm and well perfused distally, palpable distal pulses\par \par MSK:\par Lumbar spine:\par No tenderness to palpation.  No step-off, no deformity.\par \par NEURO EXAM:\par Sensation \par Left L2  -  2/2            \par Left L3  -  2/2\par Left L4  -  2/2\par Left L5  -  2/2\par Left S1  -  2/2\par \par Right L2  -  2/2            \par Right L3  -  2/2\par Right L4  -  2/2\par Right L5  -  2/2\par Right S1  -  2/2\par \par Motor: \par Left L2 (hip flexion)                            5/5                \par Left L3 (knee extension)                   5/5                \par Left L4 (ankle dorsiflexion)                 5/5                \par Left L5 (long toe extensor)                5/5                \par Left S1 (ankle plantar flexion)           5/5\par \par Right L2 (hip flexion)                            5/5                \par Right L3 (knee extension)                   5/5                \par Right L4 (ankle dorsiflexion)                 5/5                \par Right L5 (long toe extensor)                5/5                \par Right S1 (ankle plantar flexion)           5/5\par \par Reflexes: Normal and symmetric\par Negative clonus.  Down-going Babinski.   [de-identified] : Lumbar MRI (10/21/21): 1. Compared to 8/5/2019, interval progression of multilevel degenerative changes of the lumbar spine.\par 2. Interval progression of grade 1 anterolisthesis of L3 on L4 and L4 on L5.\par 3. Moderate spinal canal stenosis at L3-4 and L4-5, mild at L1-2 and L2-3.\par 4. Severe right and moderate left foraminal stenosis at L4-5, moderate to severe bilaterally at L3-4, and mild to moderate bilaterally at L2-3 and on the left at L1-2.\par 5. Mild dextroscoliosis.\par 6. Moderate to severe lower lumbar facet arthrosis.\par \par Lumbar 4 view radiographs (10/6/21) no dislocation or fracture.  Lumbar spondylosis.  Degenerative scoliosis.  L4-L5 anterolisthesis with minimal movement on dynamic series.

## 2022-02-18 NOTE — ASSESSMENT
[FreeTextEntry1] : 66 year old male followup with acute exacerbation of chronic back pain radiating to his right leg. Since his last visit he reports he was doing well until a few days ago when he a return of his severe back pain radiating to his legs.  He is otherwise neurologically intact.  He was given a Medrol dose pack.  He can consider spinal injections and was given a referral for Dr. Dickson. He will followup in 1-2 weeks. We discussed red flag symptoms that would require emergent evaluation. He knows to call with any questions or concerns or if his symptoms acutely worsen.

## 2022-02-23 ENCOUNTER — APPOINTMENT (OUTPATIENT)
Dept: PHYSICAL MEDICINE AND REHAB | Facility: CLINIC | Age: 67
End: 2022-02-23

## 2022-04-11 ENCOUNTER — APPOINTMENT (OUTPATIENT)
Dept: ORTHOPEDIC SURGERY | Facility: CLINIC | Age: 67
End: 2022-04-11
Payer: COMMERCIAL

## 2022-04-11 PROCEDURE — 20610 DRAIN/INJ JOINT/BURSA W/O US: CPT | Mod: RT

## 2022-04-11 PROCEDURE — 99213 OFFICE O/P EST LOW 20 MIN: CPT | Mod: 25

## 2022-04-11 NOTE — PROCEDURE
[de-identified] : Patient has demonstrated limited relief from NSAIDS, rest, exercises / PT, and after discussion of the risks and benefits, the patient has elected to proceed with a corticosteroid injection into the RIGHT knee(s) via an Anterolateral site.\par Confirmed that the patient does not have history of prior adverse reactions, active, infections, or relevant allergies.   There was no erythema or warmth, and the skin was clear.  The skin was sterilized with alcohol and via sterile technique, the knee was injected 3 cc of 1% xylocaine with 40 mg Kenalog.  The injection was completed without complication and a bandage was applied.  The patient tolerated the procedure well and was given post-injection instructions.\par

## 2022-04-11 NOTE — ASSESSMENT
[FreeTextEntry1] : Discussed at length with patient exam history and imaging as well as treatment options.  Patient as time lacks cortisone injection as well as home exercises and observation\par \par \par \par

## 2022-04-11 NOTE — HISTORY OF PRESENT ILLNESS
[de-identified] : The patient is an established patient presenting for evaluation of right knee discomfort.  He states he had a stumble with hyperflexion and knee but did not land directly on the knee and has had some intermittent swelling over the last week and a half

## 2022-04-11 NOTE — PHYSICAL EXAM
[de-identified] : Right knee\par \par Constitutional: \par The patient is healthy-appearing and in no apparent distress. \par \par Gait:\par The patient ambulates with a normal gait and no limp.\par \par Cardiovascular System: \par The capillary refill is less than 2 seconds. \par \par Skin: \par There are no skin abnormalities.\par \par Right Knee: \par \par Bony Palpation: \par There is no tenderness of the medial joint line. \par There is no tenderness of the lateral joint line.\par There is no tenderness of the medial femoral chondyle.\par There is no tenderness of the lateral femoral chondyle.\par There is no tenderness of the tibial tubercle.\par There is no tenderness of the superior patella.\par There is no tenderness of the inferior patella.\par There is tenderness of the medial patellar facet.\par There is tenderness of the lateral patellar facet.\par \par Soft Tissue Palpation: \par There is tenderness of the medial retinaculum.\par There is tenderness of the lateral retinaculum.\par There is no tenderness of the quadriceps tendon.\par There is no tenderness of the patella tendon.\par There is no tenderness of the ITB.\par There is no tenderness of the pes anserine.\par \par Active Range of Motion: \par The range of motion at the knee actively and passively is full. \par \par Special Tests: \par There is a negative Apley.\par There is a negative Steinmanns. \par There is a negative Lachman and Anterior Drawer.\par There is a negative Posterior Drawer.  \par There is no varus or valgus laxity.\par \par Strength: \par There is 4+/5 hip flexion and 5/5 knee flexion and extension.  \par \par Psychiatric: \par The patient demonstrates a normal mood and affect and is active and alert.

## 2022-05-04 ENCOUNTER — APPOINTMENT (OUTPATIENT)
Dept: ORTHOPEDIC SURGERY | Facility: CLINIC | Age: 67
End: 2022-05-04
Payer: COMMERCIAL

## 2022-05-04 DIAGNOSIS — M75.50 BURSITIS OF UNSPECIFIED SHOULDER: ICD-10-CM

## 2022-05-04 DIAGNOSIS — M25.512 PAIN IN LEFT SHOULDER: ICD-10-CM

## 2022-05-04 PROCEDURE — 99213 OFFICE O/P EST LOW 20 MIN: CPT | Mod: 25

## 2022-05-04 PROCEDURE — 20610 DRAIN/INJ JOINT/BURSA W/O US: CPT | Mod: LT

## 2022-05-04 PROCEDURE — 73030 X-RAY EXAM OF SHOULDER: CPT | Mod: LT

## 2022-05-04 NOTE — PHYSICAL EXAM
[de-identified] : Left Shoulder:\par Constitutional:\par The patient is healthy-appearing and in no apparent distress. \par \par Cardiovascular System: \par The capillary refill is less than 2 seconds. \par \par Skin: \par There are no skin abnormalities.\par \par C-Spine/Neck:\par \par Active Range of Motion:\par Flexion				50\par Extension			60\par Lateral rotation			80  \par \par Left Shoulder: \par Inspection: \par There is no atrophy, erythema, warmth, swelling.\par There is no scapular winging.\par There is no AC prominence. \par \par Bony Palpation: \par There is no tenderness of the clavicle.\par There is no tenderness of the acromioclavicular joint.\par There is no tenderness of the greater tuberosity. \par There is no tenderness of the bicipital groove.\par  \par Soft Tissue Palpation: \par There is no tenderness of the trapezius.\par There is no tenderness of the rhomboid.\par There is no tenderness of the subacromial bursa. \par \par Active Range of Motion: \par Forward flexion- 				180 \par Abduction-					150\par External rotation at 0 degrees abduction-	80 \par Internal rotation at 0 degrees abduction-	80\par \par Passive Range of Motion: \par Forward flexion- 			180 \par Abduction-				150\par External rotation at 0 deg abduction-	80 \par Internal rotation at 0 deg abduction-	80\par \par Strength:\par Supraspinatus / Abduction                  5/5\par External rotation                                 5/5\par Internal roation                                   5/5\par \par Special Tests: \par Hawkin's  				Positive \par Neer's  				Positive \par Speed's  				Negative\par AC cross-over 			            Negative\par [place]'s  				Negative\par \par Neurological System: \par \par There is normal sensation to light touch C5-T1. \par \par Stability: \par There is no general laxity. \par \par Psychiatric: \par The patient demonstrates a normal mood and affect and is active and alert\par  [de-identified] : Given patient's reported history and physical examination, x-ray evaluation ( as listed below ) was ordered and performed to aid in diagnosis and treatment of the patient.\par X-ray left shoulder.  There is no significant bony / soft tissue abnormality, arthritis, or fracture.

## 2022-05-04 NOTE — ASSESSMENT
[FreeTextEntry1] : Discussed at length with patient exam history and imaging as well as treatment options and at this time patient elects home exercises and cortisone injection to the left shoulder.  Offered formal physical therapy but declined.  Patient states he will take Celebrex as he has some in home.  If no improvement is to call for MRI evaluation\par

## 2022-05-04 NOTE — PROCEDURE
[de-identified] : Patient has demonstrated limited relief from NSAIDS, rest, exercises / PT, and after discussion of the risks and benefits, the patient has elected to proceed with a corticosteroid injection into the LEFT shoulder via Posterolateral site.\par Confirmed that the patient does not have history of prior adverse reactions, active, infections, or relevant allergies.   There was no erythema or warmth, and the skin was clear.  The skin was sterilized with alcohol and via sterile technique, the shoulder was injected with 3 cc of 1% xylocaine and 40 mg of Kenalog.  The injection was completed without complication and a bandage was applied.  The patient tolerated the procedure well and was given post-injection instructions.

## 2022-05-04 NOTE — REVIEW OF SYSTEMS
Pharmacy faxed request for medication refill.    Preferred pharmacy set up and verified  
Pharmacy requesting refill: Pravastatin 40 mg  Last OV: 1/4/22  Next OV: none  Last refill: 9/20/21  Last labs: 2/4/22 FLP.   Refilled.  
[Negative] : Heme/Lymph

## 2022-05-04 NOTE — HISTORY OF PRESENT ILLNESS
[de-identified] : Location: Left shoulder- lateral\par Duration:5/4/22\par Context: fell and landed on left side\par Quality: achy, sharp\par Aggravating factors: ROM\par Associated symptoms: N/A\par Conservative treatment: ice\par Prior studies: N/A

## 2022-06-01 ENCOUNTER — APPOINTMENT (OUTPATIENT)
Dept: ORTHOPEDIC SURGERY | Facility: CLINIC | Age: 67
End: 2022-06-01
Payer: COMMERCIAL

## 2022-06-01 DIAGNOSIS — M75.52 BURSITIS OF LEFT SHOULDER: ICD-10-CM

## 2022-06-01 DIAGNOSIS — M23.92 UNSPECIFIED INTERNAL DERANGEMENT OF LEFT KNEE: ICD-10-CM

## 2022-06-01 PROCEDURE — 20610 DRAIN/INJ JOINT/BURSA W/O US: CPT | Mod: LT

## 2022-06-01 PROCEDURE — 99214 OFFICE O/P EST MOD 30 MIN: CPT | Mod: 25

## 2022-06-02 NOTE — PROCEDURE
[de-identified] : Patient has demonstrated limited relief from NSAIDS, rest, exercises / PT, and after discussion of the risks and benefits, the patient has elected to proceed with a corticosteroid injection into the LEFT shoulder via Posterolateral site.\par Confirmed that the patient does not have history of prior adverse reactions, active, infections, or relevant allergies.   There was no erythema or warmth, and the skin was clear.  The skin was sterilized with alcohol and via sterile technique, the shoulder was injected with 3 cc of 1% xylocaine and 40 mg of Kenalog.  The injection was completed without complication and a bandage was applied.  The patient tolerated the procedure well and was given post-injection instructions.

## 2022-06-02 NOTE — HISTORY OF PRESENT ILLNESS
[de-identified] : The patient is an established patient presenting for followup in regards to his left shoulder.  Patient states he felt significant improvement after the cortisone to the left shoulder but has since recurred atraumatically in onset.  He states mild temporary improvement with home exercises but still is performing with no significant relief.  Patient also states she's had recurrent left knee discomfort of which she had 3 years ago which had temporarily improved but still does have some baseline medial discomfort to his knee.

## 2022-06-02 NOTE — PHYSICAL EXAM
[de-identified] : Left Shoulder:\par \par Constitutional:\par The patient is healthy-appearing and in no apparent distress. \par \par Cardiovascular System: \par The capillary refill is less than 2 seconds. \par \par Skin: \par There are no skin abnormalities.\par \par C-Spine/Neck:\par \par Active Range of Motion:\par Flexion				50\par Extension			60\par Lateral rotation			80  \par \par Left Shoulder: \par Inspection: \par There is no atrophy, erythema, warmth, swelling.\par There is no scapular winging.\par There is no AC prominence. \par \par Bony Palpation: \par There is no tenderness of the clavicle.\par There is no tenderness of the acromioclavicular joint.\par There is no tenderness of the greater tuberosity. \par There is no tenderness of the bicipital groove.\par  \par Soft Tissue Palpation: \par There is no tenderness of the trapezius.\par There is no tenderness of the rhomboid.\par There is no tenderness of the subacromial bursa. \par \par Active Range of Motion: \par Forward flexion- 				180 \par Abduction-					150\par External rotation at 0 degrees abduction-	80 \par Internal rotation at 0 degrees abduction-	80\par \par Passive Range of Motion: \par Forward flexion- 			180 \par Abduction-				150\par External rotation at 0 deg abduction-	80 \par Internal rotation at 0 deg abduction-	80\par \par Strength:\par Supraspinatus / Abduction                  5/5\par External rotation                                 5/5\par Internal rotation                                   5/5\par \par Special Tests: \par Hawkin's  				Positive \par Neer's  				Positive \par Speed's  				Negative\par AC cross-over 			            Negative\par [place]'s  				Negative\par \par Neurological System: \par \par There is normal sensation to light touch C5-T1. \par \par Stability: \par There is no general laxity. \par \par Psychiatric: \par The patient demonstrates a normal mood and affect and is active and alert\par \par \par Left knee\par \par Gait:\par The patient ambulates with a mild limp.\par \par Cardiovascular System: \par The capillary refill is less than 2 seconds. \par \par Skin: \par There are no skin abnormalities.\par \par Left Knee:\par  \par Bony Palpation: \par There is TENDERNESS of the medial joint line. \par There is no tenderness of the lateral joint line.\par There is no tenderness of the medial femoral chondyle.\par There is no tenderness of the lateral femoral chondyle.\par There is no tenderness of the tibial tubercle.\par There is no tenderness of the superior patella.\par There is no tenderness of the inferior patella.\par There is no tenderness of the medial patellar facet.\par There is no tenderness of the lateral patellar facet.\par \par Soft Tissue Palpation: \par There is no tenderness of the medial retinaculum.\par There is no tenderness of the lateral retinaculum.\par There is no tenderness of the quadriceps tendon.\par There is no tenderness of the patella tendon.\par There is no tenderness of the ITB.\par There is no tenderness of the pes anserine.\par \par Active Range of Motion: \par The range of motion at the knee actively and passively is full. \par \par Special Tests: \par There is a POSITIVE Apley.\par There is a POSITIVE Steinmanns. \par There is a negative Lachman and Anterior Drawer.\par There is a negative Posterior Drawer.  \par There is no varus or valgus laxity.\par \par Strength: \par There is 5/5 hip flexion and 5/5 knee flexion and extension.  \par \par

## 2022-06-02 NOTE — ASSESSMENT
[FreeTextEntry1] : Discussed at length again with patient persistent and recurrent left shoulder discomfort and he elects repeat cortisone injection as well as MRI evaluation event failure of nonoperative treatments provided significant relief.  In regards to the knee patient has a history of meniscus pathology and recurrence of symptoms over the last several weeks with no improvement with rest and anti-inflammatory.  \par \par

## 2022-06-16 ENCOUNTER — NON-APPOINTMENT (OUTPATIENT)
Age: 67
End: 2022-06-16

## 2022-07-26 ENCOUNTER — APPOINTMENT (OUTPATIENT)
Dept: ORTHOPEDIC SURGERY | Facility: CLINIC | Age: 67
End: 2022-07-26

## 2022-07-26 PROCEDURE — 72050 X-RAY EXAM NECK SPINE 4/5VWS: CPT

## 2022-07-26 PROCEDURE — 99214 OFFICE O/P EST MOD 30 MIN: CPT

## 2022-07-26 RX ORDER — METHYLPREDNISOLONE 4 MG/1
4 TABLET ORAL
Qty: 1 | Refills: 0 | Status: ACTIVE | COMMUNITY
Start: 2022-07-26 | End: 1900-01-01

## 2022-07-26 NOTE — HISTORY OF PRESENT ILLNESS
[de-identified] : 67 year old male previously seen for low back pain and lumbar radiculopathy (which have since resolved) now presents with new orthopedic issue of neck pain radiating to his right radial forearm.   He says the pain started when he was reaching for an item.  He has been doing PT without improvement.  He denies recent illness, fevers, numbness, weakness, balance problems, saddle anesthesia, urinary retention or fecal incontinence.  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

## 2022-07-26 NOTE — PHYSICAL EXAM
[de-identified] : General: No acute distress, conversant, well-nourished.\par Head: Normocephalic, atraumatic\par Neck: trachea midline, FROM\par Heart: normotensive and normal rate and rhythm\par Lungs: No labored breathing\par Skin: No abrasions, no rashes, no edema\par Psych: Alert and oriented to person, place and time\par Extremities: no peripheral edema or digital cyanosis\par Gait: Normal gait. Can perform tandem gait.  \par Vascular: warm and well perfused distally, palpable distal pulses\par \par MSK:\par Spine: \par No tenderness to palpation.  No step-off, no deformity.\par \par NEURO:\par Sensation \par          Left           \par C5     2/2               \par C6     2/2               \par C7     2/2               \par C8     2/2              \par T1     2/2             \par \par          Right         \par C5     2/2               \par C6     2/2               \par C7     2/2               \par C8     2/2              \par T1     2/2      \par \par Motor: \par                                                Left             \par C5 (deltoid abduction)             5/5               \par C6 (biceps flexion)                   5/5                \par C7 (triceps extension)             5/5               \par C8 (finger flexion)                     5/5               \par T1 (interosseous)                     5/5           \par \par                                                Right           \par C5 (deltoid abduction)             5/5               \par C6 (biceps flexion)                   5/5                \par C7 (triceps extension)             5/5               \par C8 (finger flexion)                     5/5               \par T1 (interosseous)                     5/5                     \par \par Sensation \par Left L2  -  2/2            \par Left L3  -  2/2\par Left L4  -  2/2\par Left L5  -  2/2\par Left S1  -  2/2\par \par Right L2  -  2/2            \par Right L3  -  2/2\par Right L4  -  2/2\par Right L5  -  2/2\par Right S1  -  2/2\par \par Motor: \par Left L2 (hip flexion)                            5/5                \par Left L3 (knee extension)                   5/5                \par Left L4 (ankle dorsiflexion)                 5/5                \par Left L5 (long toe extensor)                5/5                \par Left S1 (ankle plantar flexion)           5/5\par \par Right L2 (hip flexion)                            5/5                \par Right L3 (knee extension)                   5/5                \par Right L4 (ankle dorsiflexion)                 5/5                \par Right L5 (long toe extensor)                5/5                \par Right S1 (ankle plantar flexion)           5/5\par \par Reflexes: Normal and symmetric\par Positive Spurling’s test.  Negative Templeton’s reflex.  \par Negative clonus.  Down-going Babinski. [de-identified] : I ordered radiographs to evaluate the patient's symptoms.\par \par Cervical 4 view radiographs taken in the office today show no dislocation or fracture. Cervical spondylosis.  C4-C5 anterolisthesis with no movement on dynamic series.\par \par Lumbar MRI (10/21/21): 1. Compared to 8/5/2019, interval progression of multilevel degenerative changes of the lumbar spine.\par 2. Interval progression of grade 1 anterolisthesis of L3 on L4 and L4 on L5.\par 3. Moderate spinal canal stenosis at L3-4 and L4-5, mild at L1-2 and L2-3.\par 4. Severe right and moderate left foraminal stenosis at L4-5, moderate to severe bilaterally at L3-4, and mild to moderate bilaterally at L2-3 and on the left at L1-2.\par 5. Mild dextroscoliosis.\par 6. Moderate to severe lower lumbar facet arthrosis.\par \par Lumbar 4 view radiographs (10/6/21) no dislocation or fracture.  Lumbar spondylosis.  Degenerative scoliosis.  L4-L5 anterolisthesis with minimal movement on dynamic series.

## 2022-07-26 NOTE — ASSESSMENT
[FreeTextEntry1] : 67 year old male previously seen for low back pain and lumbar radiculopathy (which have since resolved) now presents with new orthopedic issue of neck pain radiating to his right radial forearm.   He says the pain started when he was reaching for an item. He is otherwise neurologically intact.  He was given a steroid dose pack. The patient was given a referral for physical therapy.  He will followup in 2 weeks.  If fails to improve will obtain MRI. We discussed red flag symptoms that would require emergent evaluation. He knows to call with any questions or concerns or if his symptoms acutely worsen.

## 2022-08-01 ENCOUNTER — APPOINTMENT (OUTPATIENT)
Dept: ORTHOPEDIC SURGERY | Facility: CLINIC | Age: 67
End: 2022-08-01

## 2022-08-01 DIAGNOSIS — M25.551 PAIN IN RIGHT HIP: ICD-10-CM

## 2022-08-01 DIAGNOSIS — M70.61 TROCHANTERIC BURSITIS, RIGHT HIP: ICD-10-CM

## 2022-08-01 DIAGNOSIS — S70.01XA CONTUSION OF RIGHT HIP, INITIAL ENCOUNTER: ICD-10-CM

## 2022-08-01 PROCEDURE — 73502 X-RAY EXAM HIP UNI 2-3 VIEWS: CPT

## 2022-08-01 PROCEDURE — 99213 OFFICE O/P EST LOW 20 MIN: CPT

## 2022-08-01 NOTE — HISTORY OF PRESENT ILLNESS
[de-identified] : Patient is an established patient who states last night he fell out of bed landing directly on his lateral hip and thigh.  He denies any groin.  Denies any back pain.  States overall feeling much improved but wanted to follow up for evaluation

## 2022-08-01 NOTE — ASSESSMENT
[FreeTextEntry1] : Discussed at length with patient exam history and imaging and at this time patient elects home exercises and observation..  if persistent symptoms or any increased pain patient is to call for MRI

## 2022-08-01 NOTE — PHYSICAL EXAM
[de-identified] : Right hip\par \par Constitutional: \par The patient is healthy-appearing and in no apparent distress. \par \par Gait:\par The patient ambulates with a normal gait and no limp.\par \par Cardiovascular System: \par There is capillary refill less than 2 seconds. \par \par Skin: \par There is no skin abnormalities.\par \par Lumbar Spine;\par There is no significance malalignment and no tenderness to the paraspinal musculature.\par \par Right hip:\par \par Bony Palpation: \par There is mild tenderness of the iliac crest.\par There is no tenderness of the ASIS.\par There is no tenderness of the PSIS.\par There is no tenderness of the SI joint.\par There is mild tenderness of the greater trochanter. \par \par Soft Tissue Palpation: \par There is no tenderness of the hip adductors.\par There is no tenderness of the hip abductors.\par There is no tenderness of the hamstrings. \par There is no tenderness of the piriformis. \par There is no tenderness of the hip flexors.\par \par Active Range of Motion: \par There is full range of motion at the hip actively and passively. \par There is no groin pain with hip logroll in seated and supine positioning.\par \par Special Tests: \par There is a positive Lianet's test.\par There is a negative González-Belle test. \par \par Strength: \par There is 5/5 hip flexion, adduction, abduction.  \par \par Psychiatric: \par The patient demonstrates a normal mood and affect and is active and alert. [de-identified] : Given patient's reported history and physical examination, x-ray evaluation ( as listed below ) was ordered and performed to aid in diagnosis and treatment of the patient.\par X-ray right hip.  There is no significant bony / soft tissue abnormality, arthritis, or fracture.\par

## 2022-08-03 PROBLEM — M25.551 RIGHT HIP PAIN: Status: ACTIVE | Noted: 2022-08-03

## 2022-09-07 ENCOUNTER — APPOINTMENT (OUTPATIENT)
Dept: ORTHOPEDIC SURGERY | Facility: CLINIC | Age: 67
End: 2022-09-07

## 2022-09-07 PROCEDURE — 99214 OFFICE O/P EST MOD 30 MIN: CPT

## 2022-09-07 NOTE — ASSESSMENT
[FreeTextEntry1] : 67 year old male followup for neck pain and right cervical radiculopathy.  The pain radiates to his right radial forearm.   Since his last visit he reports some improvement with a steroid dose pack.  He has been doing PT without much improvement.  He is otherwise neurologically intact.  He will be sent for a cervical MRI. He can continue PT. He can continue celebrex as needed.  He will followup once the MRI has been completed. We discussed red flag symptoms that would require emergent evaluation. He knows to call with any questions or concerns or if his symptoms acutely worsen.

## 2022-09-07 NOTE — PHYSICAL EXAM
[de-identified] : General: No acute distress, conversant, well-nourished.\par Head: Normocephalic, atraumatic\par Neck: trachea midline, FROM\par Heart: normotensive and normal rate and rhythm\par Lungs: No labored breathing\par Skin: No abrasions, no rashes, no edema\par Psych: Alert and oriented to person, place and time\par Extremities: no peripheral edema or digital cyanosis\par Gait: Normal gait. Can perform tandem gait.  \par Vascular: warm and well perfused distally, palpable distal pulses\par \par MSK:\par Spine: \par No tenderness to palpation.  No step-off, no deformity.\par \par NEURO:\par Sensation \par          Left           \par C5     2/2               \par C6     2/2               \par C7     2/2               \par C8     2/2              \par T1     2/2             \par \par          Right         \par C5     2/2               \par C6     2/2               \par C7     2/2               \par C8     2/2              \par T1     2/2      \par \par Motor: \par                                                Left             \par C5 (deltoid abduction)             5/5               \par C6 (biceps flexion)                   5/5                \par C7 (triceps extension)             5/5               \par C8 (finger flexion)                     5/5               \par T1 (interosseous)                     5/5           \par \par                                                Right           \par C5 (deltoid abduction)             5/5               \par C6 (biceps flexion)                   5/5                \par C7 (triceps extension)             5/5               \par C8 (finger flexion)                     5/5               \par T1 (interosseous)                     5/5                     \par \par Sensation \par Left L2  -  2/2            \par Left L3  -  2/2\par Left L4  -  2/2\par Left L5  -  2/2\par Left S1  -  2/2\par \par Right L2  -  2/2            \par Right L3  -  2/2\par Right L4  -  2/2\par Right L5  -  2/2\par Right S1  -  2/2\par \par Motor: \par Left L2 (hip flexion)                            5/5                \par Left L3 (knee extension)                   5/5                \par Left L4 (ankle dorsiflexion)                 5/5                \par Left L5 (long toe extensor)                5/5                \par Left S1 (ankle plantar flexion)           5/5\par \par Right L2 (hip flexion)                            5/5                \par Right L3 (knee extension)                   5/5                \par Right L4 (ankle dorsiflexion)                 5/5                \par Right L5 (long toe extensor)                5/5                \par Right S1 (ankle plantar flexion)           5/5\par \par Reflexes: Normal and symmetric\par Positive Spurling’s test.  Negative Templeton’s reflex.  \par Negative clonus.  Down-going Babinski. [de-identified] : Cervical 4 view radiographs (7/26/22) no dislocation or fracture. Cervical spondylosis.  C4-C5 anterolisthesis with no movement on dynamic series.\par \par Lumbar MRI (10/21/21): 1. Compared to 8/5/2019, interval progression of multilevel degenerative changes of the lumbar spine.\par 2. Interval progression of grade 1 anterolisthesis of L3 on L4 and L4 on L5.\par 3. Moderate spinal canal stenosis at L3-4 and L4-5, mild at L1-2 and L2-3.\par 4. Severe right and moderate left foraminal stenosis at L4-5, moderate to severe bilaterally at L3-4, and mild to moderate bilaterally at L2-3 and on the left at L1-2.\par 5. Mild dextroscoliosis.\par 6. Moderate to severe lower lumbar facet arthrosis.\par \par Lumbar 4 view radiographs (10/6/21) no dislocation or fracture.  Lumbar spondylosis.  Degenerative scoliosis.  L4-L5 anterolisthesis with minimal movement on dynamic series.

## 2022-10-05 ENCOUNTER — APPOINTMENT (OUTPATIENT)
Dept: ORTHOPEDIC SURGERY | Facility: CLINIC | Age: 67
End: 2022-10-05

## 2022-10-05 DIAGNOSIS — M54.12 RADICULOPATHY, CERVICAL REGION: ICD-10-CM

## 2022-10-05 PROCEDURE — 99214 OFFICE O/P EST MOD 30 MIN: CPT

## 2022-10-08 PROBLEM — M54.12 CERVICAL RADICULOPATHY: Status: ACTIVE | Noted: 2022-07-26

## 2022-10-08 NOTE — PHYSICAL EXAM
[de-identified] : General: No acute distress, conversant, well-nourished.\par Head: Normocephalic, atraumatic\par Neck: trachea midline, FROM\par Heart: normotensive and normal rate and rhythm\par Lungs: No labored breathing\par Skin: No abrasions, no rashes, no edema\par Psych: Alert and oriented to person, place and time\par Extremities: no peripheral edema or digital cyanosis\par Gait: Normal gait. Can perform tandem gait.  \par Vascular: warm and well perfused distally, palpable distal pulses\par \par MSK:\par Spine: \par No tenderness to palpation.  No step-off, no deformity.\par \par NEURO:\par Sensation \par          Left           \par C5     2/2               \par C6     2/2               \par C7     2/2               \par C8     2/2              \par T1     2/2             \par \par          Right         \par C5     2/2               \par C6     2/2               \par C7     2/2               \par C8     2/2              \par T1     2/2      \par \par Motor: \par                                                Left             \par C5 (deltoid abduction)             5/5               \par C6 (biceps flexion)                   5/5                \par C7 (triceps extension)             5/5               \par C8 (finger flexion)                     5/5               \par T1 (interosseous)                     5/5           \par \par                                                Right           \par C5 (deltoid abduction)             5/5               \par C6 (biceps flexion)                   5/5                \par C7 (triceps extension)             5/5               \par C8 (finger flexion)                     5/5               \par T1 (interosseous)                     5/5                     \par \par Sensation \par Left L2  -  2/2            \par Left L3  -  2/2\par Left L4  -  2/2\par Left L5  -  2/2\par Left S1  -  2/2\par \par Right L2  -  2/2            \par Right L3  -  2/2\par Right L4  -  2/2\par Right L5  -  2/2\par Right S1  -  2/2\par \par Motor: \par Left L2 (hip flexion)                            5/5                \par Left L3 (knee extension)                   5/5                \par Left L4 (ankle dorsiflexion)                 5/5                \par Left L5 (long toe extensor)                5/5                \par Left S1 (ankle plantar flexion)           5/5\par \par Right L2 (hip flexion)                            5/5                \par Right L3 (knee extension)                   5/5                \par Right L4 (ankle dorsiflexion)                 5/5                \par Right L5 (long toe extensor)                5/5                \par Right S1 (ankle plantar flexion)           5/5\par \par Reflexes: Normal and symmetric\par Positive Spurling’s test.  Negative Templeton’s reflex.  \par Negative clonus.  Down-going Babinski. [de-identified] : Cervical MRI (9/21/22): The craniocervical and atlantoaxial articulations are unremarkable. The cerebellar tonsils are normally positioned. Grade 1 anterolisthesis at C4-C5 is observed. Mild retrolisthesis at C5-C6 is demonstrated. Vertebral body heights are maintained. No marrow signal abnormality is detected.\par \par C2-C3: Left facet and uncovertebral arthropathy result in moderate-severe left foraminal narrowing. No central canal stenosis or right foraminal narrowing is observed.\par \par C3-C4: A disc bulge is present. There is no resultant spinal cord compression. Facet arthropathy contributes to moderate bilateral foraminal narrowing.\par \par C4-C5: Anterolisthesis results in partial unroofing of the posterior margin of the disc. A disc bulge abuts the adjacent ventral spinal cord. There is no associated spinal cord edema or myelomalacia. Right greater than left facet arthropathy is observed. There is moderate-severe right foraminal narrowing and mild left foraminal narrowing.\par \par C5-C6: A disc/osteophyte complex eccentric to the left results in mild spinal cord flattening. There is no associated spinal cord edema or myelomalacia.  Facet arthropathy contributes to moderate-severe left foraminal narrowing. Mild right foraminal narrowing is observed.\par \par C6-C7: A disc bulge is present. There is no resultant spinal cord compression. Right uncovertebral and facet arthropathy result in moderate right foraminal narrowing. Mild left foraminal narrowing is demonstrated.\par \par C7-T1: There is no disc herniation, central canal stenosis, or foraminal narrowing.\par \par No abnormal signal is detected within the cervical spinal cord.\par \par Cervical 4 view radiographs (7/26/22) no dislocation or fracture. Cervical spondylosis.  C4-C5 anterolisthesis with no movement on dynamic series.\par \par Lumbar MRI (10/21/21): 1. Compared to 8/5/2019, interval progression of multilevel degenerative changes of the lumbar spine.\par 2. Interval progression of grade 1 anterolisthesis of L3 on L4 and L4 on L5.\par 3. Moderate spinal canal stenosis at L3-4 and L4-5, mild at L1-2 and L2-3.\par 4. Severe right and moderate left foraminal stenosis at L4-5, moderate to severe bilaterally at L3-4, and mild to moderate bilaterally at L2-3 and on the left at L1-2.\par 5. Mild dextroscoliosis.\par 6. Moderate to severe lower lumbar facet arthrosis.\par \par Lumbar 4 view radiographs (10/6/21) no dislocation or fracture.  Lumbar spondylosis.  Degenerative scoliosis.  L4-L5 anterolisthesis with minimal movement on dynamic series.

## 2022-10-08 NOTE — ASSESSMENT
[FreeTextEntry1] : 67 year old male followup for neck pain and right cervical radiculopathy.  The pain radiates to his right radial forearm.   The is otherwise neurologically intact. We reviewed his cervical MRI.  We discussed treatment options including physical therapy, medications, and spinal injections. He will continue PT.  He will continue Celebrex.  The patient was given a referral for consideration for spinal injections.  He will followup in 6 weeks. We discussed red flag symptoms that would require emergent evaluation. He knows to call with any questions or concerns or if his symptoms acutely worsen.

## 2022-10-08 NOTE — HISTORY OF PRESENT ILLNESS
[de-identified] : 67 year old male followup for neck pain and right cervical radiculopathy.  The pain radiates to his right radial forearm.   The pain has improved with PT.  He denies recent illness, fevers, numbness, weakness, balance problems, saddle anesthesia, urinary retention or fecal incontinence.  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  He is here to review his MRI.

## 2022-11-02 ENCOUNTER — APPOINTMENT (OUTPATIENT)
Dept: ORTHOPEDIC SURGERY | Facility: CLINIC | Age: 67
End: 2022-11-02

## 2022-11-02 DIAGNOSIS — M54.16 RADICULOPATHY, LUMBAR REGION: ICD-10-CM

## 2022-11-02 DIAGNOSIS — M54.31 SCIATICA, RIGHT SIDE: ICD-10-CM

## 2022-11-02 PROCEDURE — 99213 OFFICE O/P EST LOW 20 MIN: CPT

## 2022-11-02 NOTE — ASSESSMENT
[FreeTextEntry1] : Discussed at length patient exam history and imaging with symptoms consistent with sciatic rather than a hamstring strain.  She does time elects home exercises and formal physical therapy and if no significant improvement consideration to further imaging evaluation\par

## 2022-11-02 NOTE — HISTORY OF PRESENT ILLNESS
[de-identified] : Patient is an established patient with a history of lumbar back radicular pain states several days ago he had some pain in his right posterior thigh denies any fall or specific trauma.  He denies pain denies weakness denies any groin.

## 2022-11-02 NOTE — PHYSICAL EXAM
[de-identified] : Lumbar back\par \par Constitutional: \par The patient is healthy-appearing and in no apparent distress. \par \par Gait and Station: \par The patient ambulates with a normal gait, no limp. \par \par Cardiovascular System: \par There is bilateral lower extremity capillary refill less than 2 seconds. \par \par Skin: \par There are no skin abnormalities of the lumbar spine.\par \par Lumbar Spine: \par \par Inspection: \par There is no induration, ecchymosis, or swelling. \par \par Bony Palpation: \par There is no tenderness of the spinous processes.\par There is no tenderness of the iliac crest.\par There is no tenderness of either ASIS.\par There is no tenderness of either PSIS\par There is no tenderness of the greater trochanters.\par There is no tenderness of the right SI joint.\par There is no tenderness of the left SI joint. \par \par Soft Tissue Palpation:\par There is no tenderness of the paraspinal region at L4/5. \par There is no tenderness of the posteiror thigh or hamstrings.\par  \par Active Range of Motion: \par There is normal lateral flexion and rotation. \par \par Motor Strength: \par There is 5/5 hip flexion, knee extension and flexion, ankle dorsiflexion and plantarflexion.\par \par Neurological System: \par There is normal sensation to light touch on bilateral lower extremities.\par There is a negative supine straight leg raising test.\par There is a negative seated straight leg raising test.  \par There is no clonus. \par \par Psychiatric: \par The patient demonstrates a normal mood and affect and is active and alert.

## 2022-11-14 RX ORDER — HYALURONATE SODIUM 30 MG/2 ML
30 SYRINGE (ML) INTRAARTICULAR
Qty: 3 | Refills: 0 | Status: ACTIVE | OUTPATIENT
Start: 2022-11-14

## 2022-11-23 ENCOUNTER — APPOINTMENT (OUTPATIENT)
Dept: ORTHOPEDIC SURGERY | Facility: CLINIC | Age: 67
End: 2022-11-23

## 2022-11-23 PROCEDURE — 99213 OFFICE O/P EST LOW 20 MIN: CPT

## 2022-11-27 NOTE — PHYSICAL EXAM
[de-identified] : Bilateral knees\par \par Constitutional: \par The patient is healthy-appearing and in no apparent distress. \par \par Gait:\par The patient ambulates with a normal gait and no limp.\par \par Cardiovascular System: \par The capillary refill is less than 2 seconds. \par \par Skin: \par There are no skin abnormalities.\par \par Bilateral Knee:\par  \par Bony Palpation: \par There is tenderness of the medial joint line. \par There is no tenderness of the lateral joint line.\par There is tenderness of the medial femoral chondyle.\par There is no tenderness of the lateral femoral chondyle.\par There is no tenderness of the tibial tubercle.\par There is no tenderness of the superior patella.\par There is no tenderness of the inferior patella.\par There is no tenderness of the medial patellar facet.\par There is tenderness of the lateral patellar facet.\par \par Soft Tissue Palpation: \par There is no tenderness of the medial retinaculum.\par There is no tenderness of the lateral retinaculum.\par There is no tenderness of the quadriceps tendon.\par There is no tenderness of the patella tendon.\par There is no tenderness of the ITB.\par There is no tenderness of the pes anserine.\par \par Active Range of Motion: \par The range of motion at the knee actively and passively is full. \par \par Special Tests: \par There is a negative Apley.\par There is a negative Steinmanns. \par There is a negative Lachman and Anterior Drawer.\par There is a negative Posterior Drawer.  \par There is no varus or valgus laxity.\par \par Strength: \par There is 5/5 hip flexion and 5/5 knee flexion and extension.  \par \par Psychiatric: \par The patient demonstrates a normal mood and affect and is active and alert\par \par

## 2022-11-27 NOTE — ASSESSMENT
[FreeTextEntry1] : Discussed at length with patient regarding symptoms and diagnosis.  Treatment options discussed and patient's questions answered and patient expresses understanding.  Patient offered cortisone injection, but declines at this time. Patient offered physical therapy but declined and elects home exercises/observation only.\par Patient to follow-up in office if persistent or recurrent symptoms. Patient instructed to call if any questions or concerns.  Discussed if persistent LEFT knee concerns, re-consideraiton to arthroscopy.\par

## 2022-11-27 NOTE — HISTORY OF PRESENT ILLNESS
[de-identified] : Patient is an established patient with a history of RIGHT knee arthritis as well as LEFT knee mild arthritis and medial meniscal tear.  States one week ago increased soreness in the LEFT knee which has subsequently improved.

## 2022-12-07 ENCOUNTER — APPOINTMENT (OUTPATIENT)
Dept: ORTHOPEDIC SURGERY | Facility: CLINIC | Age: 67
End: 2022-12-07

## 2022-12-07 PROCEDURE — 99213 OFFICE O/P EST LOW 20 MIN: CPT | Mod: 25

## 2022-12-07 PROCEDURE — 20611 DRAIN/INJ JOINT/BURSA W/US: CPT | Mod: RT

## 2022-12-07 NOTE — HISTORY OF PRESENT ILLNESS
[de-identified] : The patient is an established patient presented for evaluation of persistent right knee discomfort.  He has a known history of arthritis and is presenting for treatment discussion and options

## 2022-12-07 NOTE — PROCEDURE
[de-identified] : After discussion of the risks and benefits, the patient elects Euflexxa injection to the knee.  Confirmed that the patient does not have history of prior adverse reactions, active infections, or relevant allergies.  There was no effusion, erythema, or warmth, and the skin was clear.\par The skin was prepped in the usual sterile manner, ethyl chloride spray was used as a topical anesthetic.  A needle was inserted \par UTILIZING ULTRASOUND GUIDANCE TO LOCALIZE THE NEEDLE IN THE KNEE JOINT\par into the RIGHT KNEE via an anterolateral approach.  Viscosupplement was then slowly injected. The injection was completed without complication and a bandage was applied.\par The patient tolerated the procedure well and was given post-injection instructions: no exercise x 48 hours, cold packs and analgesics prn.\par

## 2022-12-07 NOTE — ASSESSMENT
[FreeTextEntry1] : Discussed at length with patient treatment options he elects a Visco supplementation of the right knee at this time

## 2022-12-14 ENCOUNTER — LABORATORY RESULT (OUTPATIENT)
Age: 67
End: 2022-12-14

## 2022-12-14 ENCOUNTER — APPOINTMENT (OUTPATIENT)
Dept: ORTHOPEDIC SURGERY | Facility: CLINIC | Age: 67
End: 2022-12-14

## 2022-12-14 PROCEDURE — 20611 DRAIN/INJ JOINT/BURSA W/US: CPT | Mod: RT

## 2022-12-14 PROCEDURE — 99213 OFFICE O/P EST LOW 20 MIN: CPT | Mod: 25

## 2022-12-14 NOTE — REASON FOR VISIT
[Follow-Up Visit] : a follow-up visit for [Knee Pain] : knee pain [FreeTextEntry2] : Right knee Euflexxa gel injection

## 2022-12-14 NOTE — PROCEDURE
[de-identified] : After discussion of the risks and benefits, the patient elects Euflexxa injection to the knee.  Confirmed that the patient does not have history of prior adverse reactions, active infections, or relevant allergies.  There was no effusion, erythema, or warmth, and the skin was clear.\par The skin was prepped in the usual sterile manner, ethyl chloride spray was used as a topical anesthetic.  A needle was inserted \par UTILIZING ULTRASOUND GUIDANCE TO LOCALIZE THE NEEDLE IN THE KNEE JOINT\par into the RIGHT KNEE via an anterolateral approach.  Viscosupplement was then slowly injected. The injection was completed without complication and a bandage was applied.\par The patient tolerated the procedure well and was given post-injection instructions: no exercise x 48 hours, cold packs and analgesics prn.\par

## 2022-12-14 NOTE — HISTORY OF PRESENT ILLNESS
[de-identified] : The patient is an established patient presented for evaluation of persistent right knee discomfort.  He has a known history of arthritis and is presenting for treatment discussion and options

## 2022-12-14 NOTE — PHYSICAL EXAM
[de-identified] : Bilateral knees\par \par Constitutional: \par The patient is healthy-appearing and in no apparent distress. \par \par Gait:\par The patient ambulates with a normal gait and no limp.\par \par Cardiovascular System: \par The capillary refill is less than 2 seconds. \par \par Skin: \par There are no skin abnormalities.\par \par Bilateral Knee:\par  \par Bony Palpation: \par There is tenderness of the medial joint line. \par There is no tenderness of the lateral joint line.\par There is tenderness of the medial femoral chondyle.\par There is no tenderness of the lateral femoral chondyle.\par There is no tenderness of the tibial tubercle.\par There is no tenderness of the superior patella.\par There is no tenderness of the inferior patella.\par There is no tenderness of the medial patellar facet.\par There is tenderness of the lateral patellar facet.\par \par Soft Tissue Palpation: \par There is no tenderness of the medial retinaculum.\par There is no tenderness of the lateral retinaculum.\par There is no tenderness of the quadriceps tendon.\par There is no tenderness of the patella tendon.\par There is no tenderness of the ITB.\par There is no tenderness of the pes anserine.\par \par Active Range of Motion: \par The range of motion at the knee actively and passively is full. \par \par Special Tests: \par There is a negative Apley.\par There is a negative Steinmanns. \par There is a negative Lachman and Anterior Drawer.\par There is a negative Posterior Drawer.  \par There is no varus or valgus laxity.\par \par Strength: \par There is 5/5 hip flexion and 5/5 knee flexion and extension.  \par \par Psychiatric: \par The patient demonstrates a normal mood and affect and is active and alert\par \par

## 2022-12-16 ENCOUNTER — APPOINTMENT (OUTPATIENT)
Dept: ORTHOPEDIC SURGERY | Facility: HOSPITAL | Age: 67
End: 2022-12-16
Payer: COMMERCIAL

## 2022-12-16 PROCEDURE — 29881 ARTHRS KNE SRG MNISECTMY M/L: CPT | Mod: LT

## 2022-12-16 PROCEDURE — 29875 ARTHRS KNEE SURG SYNVCT LMTD: CPT | Mod: LT,59

## 2022-12-16 RX ORDER — HYDROCODONE BITARTRATE AND ACETAMINOPHEN 5; 300 MG/1; MG/1
5-300 TABLET ORAL
Qty: 30 | Refills: 0 | Status: ACTIVE | COMMUNITY
Start: 2022-12-16 | End: 1900-01-01

## 2022-12-17 RX ORDER — HYDROCODONE BITARTRATE AND ACETAMINOPHEN 5; 325 MG/1; MG/1
5-325 TABLET ORAL
Qty: 20 | Refills: 0 | Status: ACTIVE | COMMUNITY
Start: 2022-12-17 | End: 1900-01-01

## 2022-12-20 ENCOUNTER — TRANSCRIPTION ENCOUNTER (OUTPATIENT)
Age: 67
End: 2022-12-20

## 2022-12-20 ENCOUNTER — APPOINTMENT (OUTPATIENT)
Dept: ORTHOPEDIC SURGERY | Facility: CLINIC | Age: 67
End: 2022-12-20

## 2022-12-20 PROCEDURE — 99024 POSTOP FOLLOW-UP VISIT: CPT

## 2022-12-20 PROCEDURE — 20611 DRAIN/INJ JOINT/BURSA W/US: CPT | Mod: RT

## 2022-12-21 NOTE — ASSESSMENT
[FreeTextEntry1] : Discussed at length with patient treatment options he elects a Visco supplementation of the right knee at this time.  PT and home exercises given for LEFT knee.  Follow-up in 3 weeks

## 2022-12-21 NOTE — PHYSICAL EXAM
[de-identified] : RIGHT knee:\par \par Constitutional: \par The patient is healthy-appearing and in no apparent distress. \par \par Gait:\par The patient ambulates with a normal gait and no limp.\par \par Cardiovascular System: \par The capillary refill is less than 2 seconds. \par \par Skin: \par There are no skin abnormalities.\par \par Bilateral Knee:\par  \par Bony Palpation: \par There is tenderness of the medial joint line. \par There is no tenderness of the lateral joint line.\par There is tenderness of the medial femoral chondyle.\par There is no tenderness of the lateral femoral chondyle.\par There is no tenderness of the tibial tubercle.\par There is no tenderness of the superior patella.\par There is no tenderness of the inferior patella.\par There is no tenderness of the medial patellar facet.\par There is tenderness of the lateral patellar facet.\par \par Soft Tissue Palpation: \par There is no tenderness of the medial retinaculum.\par There is no tenderness of the lateral retinaculum.\par There is no tenderness of the quadriceps tendon.\par There is no tenderness of the patella tendon.\par There is no tenderness of the ITB.\par There is no tenderness of the pes anserine.\par \par Active Range of Motion: \par The range of motion at the knee actively and passively is full. \par \par Special Tests: \par There is a negative Apley.\par There is a negative Steinmanns. \par There is a negative Lachman and Anterior Drawer.\par There is a negative Posterior Drawer.  \par There is no varus or valgus laxity.\par \par Strength: \par There is 5/5 hip flexion and 5/5 knee flexion and extension.  \par \par Psychiatric: \par The patient demonstrates a normal mood and affect and is active and alert\par \par Left knee\par \par Skin:\par The wounds are clean dry and intact with sutures.  Sutures removed and steri-strippled.\par \par Left Knee:\par  \par Bony Palpation: \par There is no tenderness of the medial joint line. \par There is no tenderness of the lateral joint line.\par There is no tenderness of the medial femoral chondyle.\par There is no tenderness of the lateral femoral chondyle.\par There is no tenderness of the tibial tubercle.\par There is no tenderness of the superior patella.\par There is no tenderness of the inferior patella.\par There is no tenderness of the medial patellar facet.\par There is no tenderness of the lateral patellar facet.\par \par Soft Tissue Palpation: \par There is no tenderness of the MCL.\par There is no tenderness of the medial retinaculum.\par There is no tenderness of the lateral retinaculum.\par There is no tenderness of the LCL.\par There is no tenderness of the quadriceps tendon.\par There is no tenderness of the patella tendon.\par There is no tenderness of the ITB.\par There is no tenderness of the pes anserine.\par \par Active Range of Motion: \par The range of motion at the knee actively and passively is 0 - 115. \par \par Special Tests: \par There is a negative Apley.\par There is a negative Steinmanns. \par There is a negative Lachman and Anterior Drawer.\par There is a negative Posterior Drawer.  \par There is no varus or valgus laxity.\par \par Strength: \par There is 5/5 hip flexion and 5/5 knee flexion and extension.  \par \par \par \par

## 2022-12-21 NOTE — HISTORY OF PRESENT ILLNESS
[de-identified] : Patient establish patient presenting for further discussion and treatment options her bursa right knee arthritis.  His foot is postoperative bursa left knee arthroscopy with partial medial meniscectomy and patellofemoral and medial femoral chondroplasty.  He states there was feeling much better but still sore and achy.

## 2022-12-21 NOTE — PROCEDURE
[de-identified] : After discussion of the risks and benefits, the patient elects Euflexxa injection to the knee.  Confirmed that the patient does not have history of prior adverse reactions, active infections, or relevant allergies.  There was no effusion, erythema, or warmth, and the skin was clear.\par The skin was prepped in the usual sterile manner, ethyl chloride spray was used as a topical anesthetic.  A needle was inserted \par UTILIZING ULTRASOUND GUIDANCE TO LOCALIZE THE NEEDLE IN THE KNEE JOINT\par into the RIGHT KNEE via an anterolateral approach.  Viscosupplement was then slowly injected. The injection was completed without complication and a bandage was applied.\par The patient tolerated the procedure well and was given post-injection instructions: no exercise x 48 hours, cold packs and analgesics prn.\par

## 2023-01-09 ENCOUNTER — APPOINTMENT (OUTPATIENT)
Dept: ORTHOPEDIC SURGERY | Facility: CLINIC | Age: 68
End: 2023-01-09
Payer: COMMERCIAL

## 2023-01-09 DIAGNOSIS — M17.11 UNILATERAL PRIMARY OSTEOARTHRITIS, RIGHT KNEE: ICD-10-CM

## 2023-01-09 PROCEDURE — 99213 OFFICE O/P EST LOW 20 MIN: CPT | Mod: 25

## 2023-01-09 PROCEDURE — 20610 DRAIN/INJ JOINT/BURSA W/O US: CPT | Mod: RT

## 2023-01-10 PROBLEM — M17.11 ARTHRITIS OF KNEE, RIGHT: Status: ACTIVE | Noted: 2022-11-14

## 2023-01-10 NOTE — ASSESSMENT
[FreeTextEntry1] : Discussed at length with patient exam history and imaging as well as treatment options and expressively discussed with the patient the viscous supplementation he states given the current pain he would like to have a cortisone injection and he understands that this may indicate some of his previous viscous.  He is aware ultimately have underlying arthritis if observation cortisone injection treatments to significantly relieve his pain consideration to a knee replacement

## 2023-01-10 NOTE — PHYSICAL EXAM
[de-identified] : Right knee\par \par Constitutional: \par The patient is healthy-appearing and in no apparent distress. \par \par Gait:\par The patient ambulates with a normal gait and no limp.\par \par Cardiovascular System: \par The capillary refill is less than 2 seconds. \par \par Skin: \par There are no skin abnormalities.\par There is a mild effusion.\par \par Right Knee:\par  \par Bony Palpation: \par There is tenderness of the medial joint line. \par There is no tenderness of the lateral joint line.\par There is tenderness of the medial femoral chondyle.\par There is tenderness of the lateral femoral chondyle.\par There is no tenderness of the tibial tubercle.\par There is no tenderness of the superior patella.\par There is no tenderness of the inferior patella.\par There is tenderness of the medial patellar facet.\par There is tenderness of the lateral patellar facet.\par \par Soft Tissue Palpation: \par There is no tenderness of the MCL.\par There is no tenderness of the medial retinaculum.\par There is no tenderness of the lateral retinaculum.\par There is no tenderness of the LCL.\par There is no tenderness of the quadriceps tendon.\par There is no tenderness of the patella tendon.\par There is no tenderness of the ITB.\par There is no tenderness of the pes anserine.\par \par Active Range of Motion: \par The range of motion at the knee actively and passively is 0 - 130 with pain at end flexion. \par \par Special Tests: \par There is a negative Apley.\par There is a negative Steinmanns. \par There is a negative Lachman and Anterior Drawer.\par There is a negative Posterior Drawer.  \par There is no varus or valgus laxity.\par \par Strength: \par There is 5/5 hip flexion and 5/5 knee flexion and extension.  \par \par Psychiatric: \par The patient demonstrates a normal mood and affect and is active and alert\par  [de-identified] : Ultrasound evaluation of right knee in the popliteal region reveals no evidence of any popliteal or Baker cyst.  There is a mild posterior effusion of the knee joint

## 2023-01-10 NOTE — PROCEDURE
[de-identified] : Patient has demonstrated limited relief from NSAIDS, rest, exercises / PT, and after discussion of the risks and benefits, the patient has elected to proceed with a corticosteroid injection into the RIGHT knee via an Anterolateral site.\par Confirmed that the patient does not have history of prior adverse reactions, active, infections, or relevant allergies.   There was no erythema or warmth, and the skin was clear.  The skin was sterilized with alcohol and via sterile technique, the knee was injected 3 cc of 1% xylocaine with 40 mg Kenalog.  The injection was completed without complication and a bandage was applied.  The patient tolerated the procedure well and was given post-injection instructions.\par \par

## 2023-01-10 NOTE — REASON FOR VISIT
[Follow-Up Visit] : a follow-up visit for [FreeTextEntry2] : pain/swelling behind R knee 1-2 weeks after last injection

## 2023-01-18 ENCOUNTER — APPOINTMENT (OUTPATIENT)
Dept: ORTHOPEDIC SURGERY | Facility: CLINIC | Age: 68
End: 2023-01-18

## 2023-01-26 ENCOUNTER — APPOINTMENT (OUTPATIENT)
Dept: ORTHOPEDIC SURGERY | Facility: CLINIC | Age: 68
End: 2023-01-26
Payer: COMMERCIAL

## 2023-01-26 DIAGNOSIS — M17.10 UNILATERAL PRIMARY OSTEOARTHRITIS, UNSPECIFIED KNEE: ICD-10-CM

## 2023-01-26 PROCEDURE — 20610 DRAIN/INJ JOINT/BURSA W/O US: CPT | Mod: LT

## 2023-01-26 PROCEDURE — 99024 POSTOP FOLLOW-UP VISIT: CPT

## 2023-01-26 NOTE — HISTORY OF PRESENT ILLNESS
[de-identified] : s/p LEFT knee arthroscopy with partial menisectomy, limited synovectomy [de-identified] : Patient is 6 weeks postop and states overall improved but persistent intermittent swelling.  States taking Advil and Celebrex prn.  States PT going well. [de-identified] : LEFT knee:  There is a mild swelling.  ROM 0 - 135.  No TTP to the medial or lateral joint line.  Mild TTP to medial or lateral PF. [de-identified] : s/p LEFT knee arthroscopy with partial menisectomy, limited synovectomy [de-identified] : Patient has demonstrated limited relief from NSAIDS, rest, exercises / PT, and after discussion of the risks and benefits, the patient has elected to proceed with a corticosteroid injection into the LEFT knee via an Anterolateral site.\par Confirmed that the patient does not have history of prior adverse reactions, active, infections, or relevant allergies.   There was no erythema or warmth, and the skin was clear.  The skin was sterilized with alcohol and via sterile technique, the knee was injected 3 cc of 1% xylocaine with 40 mg Kenalog.  The injection was completed without complication and a bandage was applied.  The patient tolerated the procedure well and was given post-injection instructions.\par \par Patient to follow-up as needed if persistent symptoms.

## 2023-01-30 ENCOUNTER — APPOINTMENT (OUTPATIENT)
Dept: ORTHOPEDIC SURGERY | Facility: CLINIC | Age: 68
End: 2023-01-30
Payer: COMMERCIAL

## 2023-01-30 PROCEDURE — 99024 POSTOP FOLLOW-UP VISIT: CPT

## 2023-01-31 NOTE — ASSESSMENT
[FreeTextEntry1] : Discussed at length with patient exam history and imaging at this time patient elects continued observation and recommended to start weaning off anti-inflammatory particularly given a cortisone injection\par

## 2023-01-31 NOTE — HISTORY OF PRESENT ILLNESS
[Improving] : improving [2] : a current pain level of 2/10 [de-identified] : Patient  is an established patient seen 4 days ago presenting for followup evaluation states overall feeling much improved thickly with pain and swelling after the cortisone injection were discussed simply whether he should be still taking any anti-inflammatory

## 2023-01-31 NOTE — PHYSICAL EXAM
[de-identified] : Left knee\par \par Constitutional: \par The patient is healthy-appearing and in no apparent distress. \par \par Gait:\par The patient ambulates with a normal gait and no limp.\par \par Cardiovascular System: \par The capillary refill is less than 2 seconds. \par \par Skin: \par There are no skin abnormalities.\par \par Left Knee:\par  \par Bony Palpation: \par There is no tenderness of the medial joint line. \par There is no tenderness of the lateral joint line.\par There is mild tenderness of the medial femoral chondyle.\par There is mild tenderness of the lateral femoral chondyle.\par There is no tenderness of the tibial tubercle.\par There is no tenderness of the superior patella.\par There is no tenderness of the inferior patella.\par There is mild tenderness of the medial patellar facet.\par There is mild tenderness of the lateral patellar facet.\par \par Soft Tissue Palpation: \par There is no tenderness of the MCL.\par There is no tenderness of the medial retinaculum.\par There is no tenderness of the lateral retinaculum.\par There is no tenderness of the LCL.\par There is no tenderness of the quadriceps tendon.\par There is no tenderness of the patella tendon.\par There is no tenderness of the ITB.\par There is no tenderness of the pes anserine.\par \par Active Range of Motion: \par The range of motion at the knee actively and passively is full. \par \par Special Tests: \par There is a negative Apley.\par There is a negative Steinmanns. \par There is a negative Lachman and Anterior Drawer.\par There is a negative Posterior Drawer.  \par There is no varus or valgus laxity.\par \par Strength: \par There is 5/5 hip flexion and 5/5 knee flexion and extension.  \par \par Psychiatric: \par The patient demonstrates a normal mood and affect and is active and alert

## 2023-02-08 ENCOUNTER — APPOINTMENT (OUTPATIENT)
Dept: ORTHOPEDIC SURGERY | Facility: CLINIC | Age: 68
End: 2023-02-08

## 2023-02-16 ENCOUNTER — APPOINTMENT (OUTPATIENT)
Dept: ORTHOPEDIC SURGERY | Facility: CLINIC | Age: 68
End: 2023-02-16
Payer: COMMERCIAL

## 2023-02-16 DIAGNOSIS — S83.232A COMPLEX TEAR OF MEDIAL MENISCUS, CURRENT INJURY, LEFT KNEE, INITIAL ENCOUNTER: ICD-10-CM

## 2023-02-16 PROCEDURE — 99024 POSTOP FOLLOW-UP VISIT: CPT

## 2023-02-21 PROBLEM — S83.232A COMPLEX TEAR OF MEDIAL MENISCUS OF LEFT KNEE AS CURRENT INJURY, INITIAL ENCOUNTER: Status: ACTIVE | Noted: 2022-11-29

## 2023-02-21 NOTE — HISTORY OF PRESENT ILLNESS
[de-identified] : s/p LEFT knee arthroscopy with partial menisectomy, limited synovectomy [de-identified] : Patient is 8 weeks postop and states overall improved but persistent intermittent swelling.  States taking Advil and Celebrex prn.  States PT going well. [de-identified] : LEFT knee:  There is a mild swelling.  ROM 0 - 135.  No TTP to the medial or lateral joint line.  Mild TTP to medial or lateral PF. [de-identified] : s/p LEFT knee arthroscopy with partial menisectomy, limited synovectomy [de-identified] : Recommend continued home exercises and observation.  Patient to follow-up as needed if persistent symptoms.

## 2023-02-23 ENCOUNTER — APPOINTMENT (OUTPATIENT)
Dept: ORTHOPEDIC SURGERY | Facility: CLINIC | Age: 68
End: 2023-02-23
Payer: COMMERCIAL

## 2023-02-23 DIAGNOSIS — S83.232D COMPLEX TEAR OF MEDIAL MENISCUS, CURRENT INJURY, LEFT KNEE, SUBSEQUENT ENCOUNTER: ICD-10-CM

## 2023-02-23 PROCEDURE — 99024 POSTOP FOLLOW-UP VISIT: CPT

## 2023-02-24 PROBLEM — S83.232D COMPLEX TEAR OF MEDIAL MENISCUS OF LEFT KNEE AS CURRENT INJURY, SUBSEQUENT ENCOUNTER: Status: ACTIVE | Noted: 2022-11-27

## 2023-02-24 NOTE — HISTORY OF PRESENT ILLNESS
[de-identified] : s/p LEFT knee arthroscopy with partial menisectomy, limited synovectomy [de-identified] : Patient is 9 weeks postop and states overall improved but persistent intermittent swelling.  States taking Advil and Celebrex prn.  States PT going well.  States "mild" soreness at medial knee. [de-identified] : LEFT knee:  There is a mild swelling.  ROM 0 - 135.  No TTP to the medial or lateral joint line.  Mild TTP to medial or lateral PF. [de-identified] : s/p LEFT knee arthroscopy with partial menisectomy, limited synovectomy [de-identified] : Recommend continued home exercises and observation.  Patient to follow-up as needed if persistent symptoms.

## 2023-02-24 NOTE — HISTORY OF PRESENT ILLNESS
[de-identified] : s/p LEFT knee arthroscopy with partial menisectomy, limited synovectomy [de-identified] : Patient is 9 weeks postop and states overall improved but persistent intermittent swelling.  States taking Advil and Celebrex prn.  States PT going well.  States "mild" soreness at medial knee. [de-identified] : LEFT knee:  There is a mild swelling.  ROM 0 - 135.  No TTP to the medial or lateral joint line.  Mild TTP to medial or lateral PF. [de-identified] : s/p LEFT knee arthroscopy with partial menisectomy, limited synovectomy [de-identified] : Recommend continued home exercises and observation.  Patient to follow-up as needed if persistent symptoms.

## 2023-02-27 NOTE — HISTORY OF PRESENT ILLNESS
[de-identified] : 67 year old male followup for neck pain and right cervical radiculopathy.  The pain radiates to his right radial forearm.   Since his last visit he reports some improvement with a steroid dose pack.  He has been doing PT without much improvement.  He denies recent illness, fevers, numbness, weakness, balance problems, saddle anesthesia, urinary retention or fecal incontinence.  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   PAIN

## 2023-03-08 ENCOUNTER — APPOINTMENT (OUTPATIENT)
Dept: ORTHOPEDIC SURGERY | Facility: CLINIC | Age: 68
End: 2023-03-08
Payer: COMMERCIAL

## 2023-03-08 VITALS — WEIGHT: 178 LBS | HEIGHT: 69 IN | RESPIRATION RATE: 16 BRPM | BODY MASS INDEX: 26.36 KG/M2

## 2023-03-08 DIAGNOSIS — M79.631 PAIN IN RIGHT FOREARM: ICD-10-CM

## 2023-03-08 PROCEDURE — 73070 X-RAY EXAM OF ELBOW: CPT | Mod: RT

## 2023-03-08 PROCEDURE — 99203 OFFICE O/P NEW LOW 30 MIN: CPT

## 2023-03-08 PROCEDURE — 73110 X-RAY EXAM OF WRIST: CPT | Mod: RT

## 2023-04-26 ENCOUNTER — APPOINTMENT (OUTPATIENT)
Dept: ORTHOPEDIC SURGERY | Facility: CLINIC | Age: 68
End: 2023-04-26

## 2023-05-10 ENCOUNTER — APPOINTMENT (OUTPATIENT)
Dept: ORTHOPEDIC SURGERY | Facility: CLINIC | Age: 68
End: 2023-05-10
Payer: COMMERCIAL

## 2023-05-10 PROCEDURE — 99213 OFFICE O/P EST LOW 20 MIN: CPT | Mod: 25

## 2023-05-10 PROCEDURE — 20610 DRAIN/INJ JOINT/BURSA W/O US: CPT | Mod: LT

## 2023-05-11 NOTE — ASSESSMENT
[FreeTextEntry1] : Discussed at length with patient regarding MRI and underlying arthritis.  Patient elects PT and injection.

## 2023-05-11 NOTE — PROCEDURE

## 2023-05-11 NOTE — PHYSICAL EXAM
[de-identified] : Left knee\par \par Constitutional: \par The patient is healthy-appearing and in no apparent distress. \par \par Gait:\par The patient ambulates with a normal gait and no limp.\par \par Cardiovascular System: \par The capillary refill is less than 2 seconds. \par \par Skin: \par There are no skin abnormalities.\par \par Left Knee:\par  \par Bony Palpation: \par There is no tenderness of the medial joint line. \par There is no tenderness of the lateral joint line.\par There is mild tenderness of the medial femoral chondyle.\par There is mild tenderness of the lateral femoral chondyle.\par There is no tenderness of the tibial tubercle.\par There is no tenderness of the superior patella.\par There is no tenderness of the inferior patella.\par There is mild tenderness of the medial patellar facet.\par There is mild tenderness of the lateral patellar facet.\par \par Soft Tissue Palpation: \par There is no tenderness of the MCL.\par There is no tenderness of the medial retinaculum.\par There is no tenderness of the lateral retinaculum.\par There is no tenderness of the LCL.\par There is no tenderness of the quadriceps tendon.\par There is no tenderness of the patella tendon.\par There is no tenderness of the ITB.\par There is no tenderness of the pes anserine.\par \par Active Range of Motion: \par The range of motion at the knee actively and passively is full. \par \par Special Tests: \par There is a negative Apley.\par There is a negative Steinmanns. \par There is a negative Lachman and Anterior Drawer.\par There is a negative Posterior Drawer.  \par There is no varus or valgus laxity.\par \par Strength: \par There is 5/5 hip flexion and 5/5 knee flexion and extension.  \par \par Psychiatric: \par The patient demonstrates a normal mood and affect and is active and alert

## 2023-05-11 NOTE — HISTORY OF PRESENT ILLNESS
[de-identified] : Patient is an established patient stating persistent LEFT knee pain.  States he obtianed an MRI of his LEFT knee and is presenting for evaluation.  Statess MRI from [place].  Images available- moderate mediala dn PF arthritis.  Persistent posterolateral schwanoma.

## 2023-06-28 ENCOUNTER — APPOINTMENT (OUTPATIENT)
Dept: ORTHOPEDIC SURGERY | Facility: CLINIC | Age: 68
End: 2023-06-28

## 2023-08-16 ENCOUNTER — APPOINTMENT (OUTPATIENT)
Dept: ORTHOPEDIC SURGERY | Facility: CLINIC | Age: 68
End: 2023-08-16
Payer: COMMERCIAL

## 2023-08-16 PROCEDURE — 99213 OFFICE O/P EST LOW 20 MIN: CPT

## 2023-08-18 NOTE — ASSESSMENT
[FreeTextEntry1] : Discussed at length with patient regarding MRI and underlying arthritis.  Patient elects to consider his options on injections versus TKR.

## 2023-08-18 NOTE — PHYSICAL EXAM
[de-identified] : Left knee  Constitutional:  The patient is healthy-appearing and in no apparent distress.   Gait: The patient ambulates with a normal gait and no limp.  Cardiovascular System:  The capillary refill is less than 2 seconds.   Skin:  There are no skin abnormalities.  Left Knee:   Bony Palpation:  There is tenderness of the medial joint line.  There is no tenderness of the lateral joint line. There is tenderness of the medial femoral chondyle. There is tenderness of the lateral femoral chondyle. There is no tenderness of the tibial tubercle. There is no tenderness of the superior patella. There is no tenderness of the inferior patella. There is tenderness of the medial patellar facet. There is tenderness of the lateral patellar facet.  Soft Tissue Palpation:  There is no tenderness of the MCL. There is no tenderness of the medial retinaculum. There is no tenderness of the lateral retinaculum. There is no tenderness of the LCL. There is no tenderness of the quadriceps tendon. There is no tenderness of the patella tendon. There is no tenderness of the ITB. There is no tenderness of the pes anserine.  Active Range of Motion:  The range of motion at the knee actively and passively is full.   Special Tests:  There is a negative Apley. There is a negative Steinmanns.  There is a negative Lachman and Anterior Drawer. There is a negative Posterior Drawer.   There is no varus or valgus laxity.  Strength:  There is 5/5 hip flexion and 5/5 knee flexion and extension.    Psychiatric:  The patient demonstrates a normal mood and affect and is active and alert

## 2023-08-18 NOTE — HISTORY OF PRESENT ILLNESS
[de-identified] : Patient is an established patient stating persistent LEFT knee pain.   MRI of his LEFT knee and is presenting for evaluation.

## 2023-08-24 ENCOUNTER — APPOINTMENT (OUTPATIENT)
Dept: ORTHOPEDIC SURGERY | Facility: CLINIC | Age: 68
End: 2023-08-24
Payer: COMMERCIAL

## 2023-08-24 PROCEDURE — 99213 OFFICE O/P EST LOW 20 MIN: CPT | Mod: 25

## 2023-08-24 PROCEDURE — 20610 DRAIN/INJ JOINT/BURSA W/O US: CPT | Mod: LT

## 2023-08-24 RX ORDER — HYALURONATE SODIUM 30 MG/2 ML
30 SYRINGE (ML) INTRAARTICULAR
Qty: 3 | Refills: 0 | Status: ACTIVE | OUTPATIENT
Start: 2023-08-24

## 2023-09-07 NOTE — ASSESSMENT
[FreeTextEntry1] : Discussed at length again with patient underlying arthritis and treatment options and at this time he elects cortisone injection as well is would like to try viscosupplementation if no improvement with these measures he will consider total knee replacement,   Viscosupplementation again will be reordered as the patient in the past has had significant relief with these measures in regards to his discomfort and pain as well as functional limitations

## 2023-09-07 NOTE — PHYSICAL EXAM
[de-identified] : Left knee  Constitutional:  The patient is healthy-appearing and in no apparent distress.   Gait: The patient ambulates with a normal gait and no limp.  Cardiovascular System:  The capillary refill is less than 2 seconds.   Skin:  There are no skin abnormalities.  Left Knee:   Bony Palpation:  There is tenderness of the medial joint line.  There is no tenderness of the lateral joint line. There is tenderness of the medial femoral chondyle. There is tenderness of the lateral femoral chondyle. There is no tenderness of the tibial tubercle. There is no tenderness of the superior patella. There is no tenderness of the inferior patella. There is tenderness of the medial patellar facet. There is tenderness of the lateral patellar facet.  Soft Tissue Palpation:  There is no tenderness of the MCL. There is no tenderness of the medial retinaculum. There is no tenderness of the lateral retinaculum. There is no tenderness of the LCL. There is no tenderness of the quadriceps tendon. There is no tenderness of the patella tendon. There is no tenderness of the ITB. There is no tenderness of the pes anserine.  Active Range of Motion:  The range of motion at the knee actively and passively is full.   Special Tests:  There is a negative Apley. There is a negative Steinmanns.  There is a negative Lachman and Anterior Drawer. There is a negative Posterior Drawer.   There is no varus or valgus laxity.  Strength:  There is 5/5 hip flexion and 5/5 knee flexion and extension.    Psychiatric:  The patient demonstrates a normal mood and affect and is active and alert

## 2023-09-07 NOTE — HISTORY OF PRESENT ILLNESS
[de-identified] : Patient is an established patient presenting for follow-up evaluation regards to left knee arthritis and treatment options

## 2023-11-02 ENCOUNTER — APPOINTMENT (OUTPATIENT)
Dept: ORTHOPEDIC SURGERY | Facility: CLINIC | Age: 68
End: 2023-11-02

## 2023-11-02 ENCOUNTER — APPOINTMENT (OUTPATIENT)
Dept: ORTHOPEDIC SURGERY | Facility: CLINIC | Age: 68
End: 2023-11-02
Payer: COMMERCIAL

## 2023-11-02 DIAGNOSIS — M17.12 UNILATERAL PRIMARY OSTEOARTHRITIS, LEFT KNEE: ICD-10-CM

## 2023-11-02 PROCEDURE — 99212 OFFICE O/P EST SF 10 MIN: CPT

## 2023-11-03 PROBLEM — M17.12 ARTHRITIS OF KNEE, LEFT: Status: ACTIVE | Noted: 2023-05-10

## 2024-02-07 ENCOUNTER — APPOINTMENT (OUTPATIENT)
Dept: ORTHOPEDIC SURGERY | Facility: CLINIC | Age: 69
End: 2024-02-07
Payer: COMMERCIAL

## 2024-02-07 DIAGNOSIS — M79.645 PAIN IN LEFT FINGER(S): ICD-10-CM

## 2024-02-07 PROCEDURE — 20550 NJX 1 TENDON SHEATH/LIGAMENT: CPT

## 2024-02-07 PROCEDURE — 73140 X-RAY EXAM OF FINGER(S): CPT

## 2024-02-28 ENCOUNTER — APPOINTMENT (OUTPATIENT)
Dept: ORTHOPEDIC SURGERY | Facility: CLINIC | Age: 69
End: 2024-02-28
Payer: COMMERCIAL

## 2024-02-28 DIAGNOSIS — M77.11 LATERAL EPICONDYLITIS, RIGHT ELBOW: ICD-10-CM

## 2024-02-28 DIAGNOSIS — M18.9 OSTEOARTHRITIS OF FIRST CARPOMETACARPAL JOINT, UNSPECIFIED: ICD-10-CM

## 2024-02-28 PROCEDURE — 99214 OFFICE O/P EST MOD 30 MIN: CPT

## 2024-02-28 PROCEDURE — 73140 X-RAY EXAM OF FINGER(S): CPT

## 2024-05-08 ENCOUNTER — APPOINTMENT (OUTPATIENT)
Dept: ORTHOPEDIC SURGERY | Facility: CLINIC | Age: 69
End: 2024-05-08
Payer: COMMERCIAL

## 2024-05-08 DIAGNOSIS — M54.50 LOW BACK PAIN, UNSPECIFIED: ICD-10-CM

## 2024-05-08 DIAGNOSIS — M54.2 CERVICALGIA: ICD-10-CM

## 2024-05-08 PROCEDURE — 72050 X-RAY EXAM NECK SPINE 4/5VWS: CPT

## 2024-05-08 PROCEDURE — 99214 OFFICE O/P EST MOD 30 MIN: CPT

## 2024-05-08 PROCEDURE — 72110 X-RAY EXAM L-2 SPINE 4/>VWS: CPT

## 2024-05-08 RX ORDER — CYCLOBENZAPRINE HYDROCHLORIDE 5 MG/1
5 TABLET, FILM COATED ORAL 3 TIMES DAILY
Qty: 21 | Refills: 0 | Status: ACTIVE | COMMUNITY
Start: 2024-05-08 | End: 1900-01-01

## 2024-05-08 RX ORDER — METHYLPREDNISOLONE 4 MG/1
4 TABLET ORAL
Qty: 1 | Refills: 0 | Status: ACTIVE | COMMUNITY
Start: 2024-05-08 | End: 1900-01-01

## 2024-05-08 NOTE — HISTORY OF PRESENT ILLNESS
[de-identified] : 67 y/o male presenting with acute exacerbation of chronic lower back pain x 1 week. Pain is localized in lower back, equal bilaterally. He also complains of localized neck pain. He went to [facility] urgent care where he got BMP/urinalysis which were negative. He took celebrex once which helped. He feels klonapin helps.  denies radicular pain, recent illness, fevers, numbness, weakness, balance problems, saddle anesthesia, urinary retention or fecal incontinence.

## 2024-05-08 NOTE — PHYSICAL EXAM
[de-identified] :  General: No acute distress, conversant, well-nourished. Head: Normocephalic, atraumatic Neck: trachea midline, FROM Heart: normotensive and normal rate and rhythm Lungs: No labored breathing Skin: No abrasions, no rashes, no edema Psych: Alert and oriented to person, place and time Extremities: no peripheral edema or digital cyanosis Gait: Normal gait. Can perform tandem gait.  Vascular: warm and well perfused distally, palpable distal pulses    MSK: Cervical Spine: Alignment normal. No tenderness to palpation.  No step-off, no deformity. No pain or neurologic symptoms with full active range of motion (flexion, extension, lateral bending and rotation).   NEURO: Sensation          Left          C5     2/2              C6     2/2              C7     2/2              C8     2/2             T1     2/2                       Right        C5     2/2              C6     2/2              C7     2/2              C8     2/2             T1     2/2       Motor:                                                Left            C5 (deltoid abduction)             5/5              C6 (biceps flexion)                   5/5               C7 (triceps extension)             5/5              C8 (finger flexion)                     5/5              T1 (interosseous)                     5/5                                                           Right          C5 (deltoid abduction)             5/5              C6 (biceps flexion)                   5/5               C7 (triceps extension)             5/5              C8 (finger flexion)                     5/5              T1 (interosseous)                     5/5                      Reflexes: Normal and symmetric   Negative Spurlings test.  Negative Bakodys sign. Negative Hoffmans reflex.    MSK: Lumbar spine: No tenderness to palpation.  No step-off, no deformity.   NEURO EXAM: Sensation Left L2  -  2/2            Left L3  -  2/2 Left L4  -  2/2 Left L5  -  2/2 Left S1  -  2/2   Right L2  -  2/2            Right L3  -  2/2 Right L4  -  2/2 Right L5  -  2/2 Right S1  -  2/2   Motor: Left L2 (hip flexion)                            5/5                Left L3 (knee extension)                   5/5                Left L4 (ankle dorsiflexion)                 5/5                Left L5 (long toe extensor)                5/5                Left S1 (ankle plantar flexion)           5/5   Right L2 (hip flexion)                            5/5                Right L3 (knee extension)                   5/5                Right L4 (ankle dorsiflexion)                 5/5                Right L5 (long toe extensor)                5/5                Right S1 (ankle plantar flexion)           5/5   Reflexes: Normal and symmetric Negative clonus.  Down-going Babinski. [de-identified] : I ordered radiographs to evaluate the patient's symptoms.   Cervical 4 view radiographs taken in the office today show no dislocation or fracture. Cervical spondylosis.  No instability on dynamic series.  Lumbar 4 view radiographs taken in the office today show no dislocation or fracture.  Lumbar spondylosis.  No instability on dynamic series.

## 2024-05-08 NOTE — REASON FOR VISIT
[FreeTextEntry2] : pain level 4/10  [Follow-Up Visit] : a follow-up visit for [Back Pain] : back pain [Neck Pain] : neck pain

## 2024-05-08 NOTE — PHYSICAL EXAM
[de-identified] :  General: No acute distress, conversant, well-nourished. Head: Normocephalic, atraumatic Neck: trachea midline, FROM Heart: normotensive and normal rate and rhythm Lungs: No labored breathing Skin: No abrasions, no rashes, no edema Psych: Alert and oriented to person, place and time Extremities: no peripheral edema or digital cyanosis Gait: Normal gait. Can perform tandem gait.  Vascular: warm and well perfused distally, palpable distal pulses    MSK: Cervical Spine: Alignment normal. No tenderness to palpation.  No step-off, no deformity. No pain or neurologic symptoms with full active range of motion (flexion, extension, lateral bending and rotation).   NEURO: Sensation          Left          C5     2/2              C6     2/2              C7     2/2              C8     2/2             T1     2/2                       Right        C5     2/2              C6     2/2              C7     2/2              C8     2/2             T1     2/2       Motor:                                                Left            C5 (deltoid abduction)             5/5              C6 (biceps flexion)                   5/5               C7 (triceps extension)             5/5              C8 (finger flexion)                     5/5              T1 (interosseous)                     5/5                                                           Right          C5 (deltoid abduction)             5/5              C6 (biceps flexion)                   5/5               C7 (triceps extension)             5/5              C8 (finger flexion)                     5/5              T1 (interosseous)                     5/5                      Reflexes: Normal and symmetric   Negative Spurlings test.  Negative Bakodys sign. Negative Hoffmans reflex.    MSK: Lumbar spine: No tenderness to palpation.  No step-off, no deformity.   NEURO EXAM: Sensation Left L2  -  2/2            Left L3  -  2/2 Left L4  -  2/2 Left L5  -  2/2 Left S1  -  2/2   Right L2  -  2/2            Right L3  -  2/2 Right L4  -  2/2 Right L5  -  2/2 Right S1  -  2/2   Motor: Left L2 (hip flexion)                            5/5                Left L3 (knee extension)                   5/5                Left L4 (ankle dorsiflexion)                 5/5                Left L5 (long toe extensor)                5/5                Left S1 (ankle plantar flexion)           5/5   Right L2 (hip flexion)                            5/5                Right L3 (knee extension)                   5/5                Right L4 (ankle dorsiflexion)                 5/5                Right L5 (long toe extensor)                5/5                Right S1 (ankle plantar flexion)           5/5   Reflexes: Normal and symmetric Negative clonus.  Down-going Babinski. [de-identified] : I ordered radiographs to evaluate the patient's symptoms.   Cervical 4 view radiographs taken in the office today show no dislocation or fracture. Cervical spondylosis.  No instability on dynamic series.  Lumbar 4 view radiographs taken in the office today show no dislocation or fracture.  Lumbar spondylosis.  No instability on dynamic series.

## 2024-05-08 NOTE — ASSESSMENT
[FreeTextEntry1] : 69 y/o male presenting with acute exacerbation of chronic lower back pain x 1 week. Pain is localized in lower back, equal bilaterally. He also complains of localized neck pain. He went to [facility] urgent care where he got BMP/urinalysis which were negative. He took celebrex once which helped. He feels klonapin helps.  denies radicular pain, recent illness, fevers, numbness, weakness, balance problems, saddle anesthesia, urinary retention or fecal incontinence. The patient was given a referral for physical therapy. Start medrol dose pack, Start cyclobenzaprine. Follow up in 4-6 weeks. We discussed red flag symptoms that would require emergent evaluation.  He knows to call with any questions or concerns or if his symptoms acutely worsen.

## 2024-05-08 NOTE — HISTORY OF PRESENT ILLNESS
[de-identified] : 67 y/o male presenting with acute exacerbation of chronic lower back pain x 1 week. Pain is localized in lower back, equal bilaterally. He also complains of localized neck pain. He went to [facility] urgent care where he got BMP/urinalysis which were negative. He took celebrex once which helped. He feels klonapin helps.  denies radicular pain, recent illness, fevers, numbness, weakness, balance problems, saddle anesthesia, urinary retention or fecal incontinence.

## 2024-05-08 NOTE — ASSESSMENT
[FreeTextEntry1] : 67 y/o male presenting with acute exacerbation of chronic lower back pain x 1 week. Pain is localized in lower back, equal bilaterally. He also complains of localized neck pain. He went to [facility] urgent care where he got BMP/urinalysis which were negative. He took celebrex once which helped. He feels klonapin helps.  denies radicular pain, recent illness, fevers, numbness, weakness, balance problems, saddle anesthesia, urinary retention or fecal incontinence. The patient was given a referral for physical therapy. Start medrol dose pack, Start cyclobenzaprine. Follow up in 4-6 weeks. We discussed red flag symptoms that would require emergent evaluation.  He knows to call with any questions or concerns or if his symptoms acutely worsen.

## 2024-05-18 PROBLEM — M54.2 NECK PAIN: Status: ACTIVE | Noted: 2024-05-16

## 2024-06-21 ENCOUNTER — APPOINTMENT (OUTPATIENT)
Dept: ORTHOPEDIC SURGERY | Facility: CLINIC | Age: 69
End: 2024-06-21
Payer: COMMERCIAL

## 2024-06-21 DIAGNOSIS — S40.021A CONTUSION OF RIGHT UPPER ARM, INITIAL ENCOUNTER: ICD-10-CM

## 2024-06-21 PROCEDURE — 99214 OFFICE O/P EST MOD 30 MIN: CPT

## 2024-06-21 PROCEDURE — 73060 X-RAY EXAM OF HUMERUS: CPT

## 2024-06-21 NOTE — DISCUSSION/SUMMARY
[de-identified] : LOCAL SOFT TISSUE INJURY NO FRACTURE ON XRAY  PLAN  NO GYM SPORTS 7 DAYS COLD PACKS 3-7 DAYS  IBUPROFEN 400-600 TID AS NEEDED

## 2024-06-21 NOTE — HISTORY OF PRESENT ILLNESS
[de-identified] : RIGHT HUMERUS  INJURY YESTERDAY JUNE 20, 2024-PT STATED THAT SUBWAY DOOR CLOSED ON RIGHT UPPER ARM PAIN LEVEL: 4/10 ACHY  INTERMITTENT

## 2024-06-21 NOTE — PHYSICAL EXAM
[de-identified] : RIGHT ARM    [de-identified] : RIGHT HUMERUS - 2 VIEWS  NO OBVIOUS FRACTURE  JOINT SPACE APPEAR NORMAL

## 2024-06-26 ENCOUNTER — APPOINTMENT (OUTPATIENT)
Dept: ORTHOPEDIC SURGERY | Facility: CLINIC | Age: 69
End: 2024-06-26

## 2024-06-26 DIAGNOSIS — M25.531 PAIN IN RIGHT WRIST: ICD-10-CM

## 2024-06-26 PROCEDURE — 20600 DRAIN/INJ JOINT/BURSA W/O US: CPT

## 2024-06-26 PROCEDURE — 99214 OFFICE O/P EST MOD 30 MIN: CPT | Mod: 25

## 2024-07-24 ENCOUNTER — APPOINTMENT (OUTPATIENT)
Dept: ORTHOPEDIC SURGERY | Facility: CLINIC | Age: 69
End: 2024-07-24
Payer: COMMERCIAL

## 2024-07-24 DIAGNOSIS — M54.50 LOW BACK PAIN, UNSPECIFIED: ICD-10-CM

## 2024-07-24 DIAGNOSIS — M53.3 SACROCOCCYGEAL DISORDERS, NOT ELSEWHERE CLASSIFIED: ICD-10-CM

## 2024-07-24 PROCEDURE — 72170 X-RAY EXAM OF PELVIS: CPT

## 2024-07-24 PROCEDURE — 72110 X-RAY EXAM L-2 SPINE 4/>VWS: CPT

## 2024-07-24 PROCEDURE — 99214 OFFICE O/P EST MOD 30 MIN: CPT

## 2024-07-24 NOTE — REASON FOR VISIT
[Follow-Up Visit] : a follow-up visit for [Back Pain] : back pain [Neck Pain] : neck pain [FreeTextEntry2] : pt had a recent fall, requested xray

## 2024-07-24 NOTE — HISTORY OF PRESENT ILLNESS
[de-identified] : 67 y/o male presenting with acute exacerbation of chronic lower back pain x 1 week after he fell and hit right lower back. He has localized right back pain. Overall, he is improving, and his pain level is a 3/10. He takes celebrex. He is currently doing PT for his lower back with relief.  denies radicular pain, recent illness, fevers, numbness, weakness, balance problems, saddle anesthesia, urinary retention or fecal incontinence.

## 2024-07-24 NOTE — DISCUSSION/SUMMARY
[de-identified] :  I, Dr. Spears personally performed the evaluation and management services for this established patient who presents today with (a) new problem(s)/exacerbation of (an) existing condition(s). That E/M includes conducting the clinically appropriate interval history &/or exam, assessing all new/exacerbated conditions, and establishing a new plan of care. Today, my TASHA, Marci Hebert was here to observe my evaluation and management service for this new problem/exacerbated condition and follow the plan of care established by me going forward.

## 2024-07-24 NOTE — PHYSICAL EXAM
[de-identified] :  General: No acute distress, conversant, well-nourished. Head: Normocephalic, atraumatic Neck: trachea midline, FROM Heart: normotensive and normal rate and rhythm Lungs: No labored breathing Skin: No abrasions, no rashes, no edema Psych: Alert and oriented to person, place and time Extremities: no peripheral edema or digital cyanosis Gait: Normal gait. Can perform tandem gait.  Vascular: warm and well perfused distally, palpable distal pulses   MSK: Lumbar spine: + tenderness to palpation.  No step-off, no deformity.   NEURO EXAM: Sensation Left L2  -  2/2            Left L3  -  2/2 Left L4  -  2/2 Left L5  -  2/2 Left S1  -  2/2   Right L2  -  2/2            Right L3  -  2/2 Right L4  -  2/2 Right L5  -  2/2 Right S1  -  2/2   Motor: Left L2 (hip flexion)                            5/5                Left L3 (knee extension)                   5/5                Left L4 (ankle dorsiflexion)                 5/5                Left L5 (long toe extensor)                5/5                Left S1 (ankle plantar flexion)           5/5   Right L2 (hip flexion)                            5/5                Right L3 (knee extension)                   5/5                Right L4 (ankle dorsiflexion)                 5/5                Right L5 (long toe extensor)                5/5                Right S1 (ankle plantar flexion)           5/5   Reflexes: Normal and symmetric Negative clonus.  Down-going Babinski. [de-identified] : I ordered radiographs to evaluate the patient's symptoms. Lumbar 4 view radiographs taken in the office today show no dislocation or fracture.  Lumbar spondylosis.  No instability on dynamic series. L4-L5 spondylolisthesis.   Pelvis 1 view radiographs taken in the office today show no dislocation or fracture.  Age-related degenerative changes.

## 2024-07-24 NOTE — ASSESSMENT
[FreeTextEntry1] : 69 y/o male presenting with acute exacerbation of chronic lower back pain x 1 week after he fell and hit right lower back. Overall, he is improving, and his pain level is a 3/10. He takes celebrex. He is currently doing PT for his lower back with relief.  denies radicular pain, recent illness, fevers, numbness, weakness, balance problems, saddle anesthesia, urinary retention or fecal incontinence. The patient was given a referral for physical therapy. Continue PT. Continue celebrex. F/U in 4-6 weeks. We discussed red flag symptoms that would require emergent evaluation.  He knows to call with any questions or concerns or if his symptoms acutely worsen.

## 2024-08-02 ENCOUNTER — APPOINTMENT (OUTPATIENT)
Dept: ORTHOPEDIC SURGERY | Facility: CLINIC | Age: 69
End: 2024-08-02

## 2024-08-02 DIAGNOSIS — M54.12 RADICULOPATHY, CERVICAL REGION: ICD-10-CM

## 2024-08-02 DIAGNOSIS — M54.50 LOW BACK PAIN, UNSPECIFIED: ICD-10-CM

## 2024-08-02 PROCEDURE — 72110 X-RAY EXAM L-2 SPINE 4/>VWS: CPT

## 2024-08-02 PROCEDURE — 99214 OFFICE O/P EST MOD 30 MIN: CPT

## 2024-08-09 NOTE — DISCUSSION/SUMMARY
[de-identified] :  I, Dr. Spears personally performed the evaluation and management services for this established patient who presents today with (a) new problem(s)/exacerbation of (an) existing condition(s). That E/M includes conducting the clinically appropriate interval history &/or exam, assessing all new/exacerbated conditions, and establishing a new plan of care. Today, my TASHA, Marci Hebert was here to observe my evaluation and management service for this new problem/exacerbated condition and follow the plan of care established by me going forward.

## 2024-08-09 NOTE — PHYSICAL EXAM
[de-identified] :  General: No acute distress, conversant, well-nourished. Head: Normocephalic, atraumatic Neck: trachea midline, FROM Heart: normotensive and normal rate and rhythm Lungs: No labored breathing Skin: No abrasions, no rashes, no edema Psych: Alert and oriented to person, place and time Extremities: no peripheral edema or digital cyanosis Gait: Normal gait. Can perform tandem gait.  Vascular: warm and well perfused distally, palpable distal pulses   MSK: Lumbar spine: + tenderness to palpation.  No step-off, no deformity.   NEURO EXAM: Sensation Left L2  -  2/2            Left L3  -  2/2 Left L4  -  2/2 Left L5  -  2/2 Left S1  -  2/2   Right L2  -  2/2            Right L3  -  2/2 Right L4  -  2/2 Right L5  -  2/2 Right S1  -  2/2   Motor: Left L2 (hip flexion)                            5/5                Left L3 (knee extension)                   5/5                Left L4 (ankle dorsiflexion)                 5/5                Left L5 (long toe extensor)                5/5                Left S1 (ankle plantar flexion)           5/5   Right L2 (hip flexion)                            5/5                Right L3 (knee extension)                   5/5                Right L4 (ankle dorsiflexion)                 5/5                Right L5 (long toe extensor)                5/5                Right S1 (ankle plantar flexion)           5/5   Reflexes: Normal and symmetric Negative clonus.  Down-going Babinski. [de-identified] : I ordered radiographs to evaluate the patient's symptoms. Lumbar 4 view radiographs taken in the office today show no dislocation or fracture.  Lumbar spondylosis.  No instability on dynamic series. L4-L5 spondylolisthesis. Scoliosis.

## 2024-08-09 NOTE — HISTORY OF PRESENT ILLNESS
[de-identified] : 69 y/o male presenting with acute exacerbation of chronic lower back pain x 2 days after his back was hit by closing elevator doors. He has localized right back pain. Overall, his pain is improving. He takes celebrex PRN. He is currently doing PT for his lower back with relief. denies radicular pain, recent illness, fevers, numbness, weakness, balance problems, saddle anesthesia, urinary retention or fecal incontinence.

## 2024-08-09 NOTE — PHYSICAL EXAM
[de-identified] :  General: No acute distress, conversant, well-nourished. Head: Normocephalic, atraumatic Neck: trachea midline, FROM Heart: normotensive and normal rate and rhythm Lungs: No labored breathing Skin: No abrasions, no rashes, no edema Psych: Alert and oriented to person, place and time Extremities: no peripheral edema or digital cyanosis Gait: Normal gait. Can perform tandem gait.  Vascular: warm and well perfused distally, palpable distal pulses   MSK: Lumbar spine: + tenderness to palpation.  No step-off, no deformity.   NEURO EXAM: Sensation Left L2  -  2/2            Left L3  -  2/2 Left L4  -  2/2 Left L5  -  2/2 Left S1  -  2/2   Right L2  -  2/2            Right L3  -  2/2 Right L4  -  2/2 Right L5  -  2/2 Right S1  -  2/2   Motor: Left L2 (hip flexion)                            5/5                Left L3 (knee extension)                   5/5                Left L4 (ankle dorsiflexion)                 5/5                Left L5 (long toe extensor)                5/5                Left S1 (ankle plantar flexion)           5/5   Right L2 (hip flexion)                            5/5                Right L3 (knee extension)                   5/5                Right L4 (ankle dorsiflexion)                 5/5                Right L5 (long toe extensor)                5/5                Right S1 (ankle plantar flexion)           5/5   Reflexes: Normal and symmetric Negative clonus.  Down-going Babinski. [de-identified] : I ordered radiographs to evaluate the patient's symptoms. Lumbar 4 view radiographs taken in the office today show no dislocation or fracture.  Lumbar spondylosis.  No instability on dynamic series. L4-L5 spondylolisthesis. Scoliosis.

## 2024-08-09 NOTE — ASSESSMENT
[FreeTextEntry1] : 67 y/o male presenting with acute exacerbation of chronic lower back pain x 2 days after his back was hit by closing elevator doors. He has localized right back pain. Overall, his pain is improving. He takes celebrex PRN. He is currently doing PT for his lower back with relief. denies radicular pain, recent illness, fevers, numbness, weakness, balance problems, saddle anesthesia, urinary retention or fecal incontinence. Continue PT. Continue celebrex. Continue PT> F/U in 4-6 weeks. We discussed red flag symptoms that would require emergent evaluation.  He knows to call with any questions or concerns or if his symptoms acutely worsen.

## 2024-08-09 NOTE — DISCUSSION/SUMMARY
[de-identified] :  I, Dr. Spears personally performed the evaluation and management services for this established patient who presents today with (a) new problem(s)/exacerbation of (an) existing condition(s). That E/M includes conducting the clinically appropriate interval history &/or exam, assessing all new/exacerbated conditions, and establishing a new plan of care. Today, my TASHA, Marci Hebert was here to observe my evaluation and management service for this new problem/exacerbated condition and follow the plan of care established by me going forward.

## 2024-08-09 NOTE — HISTORY OF PRESENT ILLNESS
[de-identified] : 69 y/o male presenting with acute exacerbation of chronic lower back pain x 2 days after his back was hit by closing elevator doors. He has localized right back pain. Overall, his pain is improving. He takes celebrex PRN. He is currently doing PT for his lower back with relief. denies radicular pain, recent illness, fevers, numbness, weakness, balance problems, saddle anesthesia, urinary retention or fecal incontinence.

## 2024-09-11 ENCOUNTER — APPOINTMENT (OUTPATIENT)
Dept: ORTHOPEDIC SURGERY | Facility: CLINIC | Age: 69
End: 2024-09-11
Payer: COMMERCIAL

## 2024-09-11 DIAGNOSIS — R22.31 LOCALIZED SWELLING, MASS AND LUMP, RIGHT UPPER LIMB: ICD-10-CM

## 2024-09-11 PROCEDURE — 73130 X-RAY EXAM OF HAND: CPT | Mod: RT

## 2024-09-11 PROCEDURE — 99214 OFFICE O/P EST MOD 30 MIN: CPT

## 2024-09-11 NOTE — HISTORY OF PRESENT ILLNESS
[FreeTextEntry1] : Patient with a history of degenerative CMC arthritis returns for follow up for his right hand, patient has a hard mass along the dorsal aspect of the right hand between the right thumb and right index finger. Patient denies any pain. No inciting traumatic event. Patient states he first noticed the mass about 1 to 2 weeks ago, patient denies the mass has grown larger over the last 2 weeks. Patient has a history of right thumb basal joint arthritis, as well as a history of right tennis elbow. Patient has received a corticosteroid injection for his left index finger trigger finger in 02/07/24, and another injection in the right side on 06/26/24.

## 2024-09-11 NOTE — ASSESSMENT
[FreeTextEntry1] : I had a lengthy discussion with the patient today regarding my suspicion for a symptomatic right hand CMC boss.  I explained the nature of the condition, including degenerative changes at the CMC joint.  Treatment options were discussed and shared decision making was made to proceed with close observation and monitoring of this.  I explained that if symptoms related to this develop, interventions can be considered, including steroid injection, in addition to splint wear.He was well in accordance with the plan.  He will follow-up with me as needed.

## 2024-09-11 NOTE — PHYSICAL EXAM
[de-identified] : The wrists have symmetric range of motion bilaterally. There is a bony prominence at the dorsal aspect of the right index CMC joint.  Nontender to palpation. There is no tenderness over the scaphoid, scapholunate, or lunotriquetral ligaments bilaterally. There is a negative Aguila's test bilaterally. There is no tenderness over the distal radial ulnar joint or TFCC and no evidence of instability bilaterally. There is no tenderness over the pisotriquetral joint, hamate hook, or CMC joints bilaterally. The patient is nontender over both scaphoids and anatomic snuffbox is bilaterally. There is no clubbing cyanosis or edema.  EPL and digital extensors are intact, Excellent range of motion.    [de-identified] : PA, oblique and lateral x-rays of the right wrist were obtained today to assess for bony injury.  CMC boss is present around the index CMC joint.

## 2024-10-16 ENCOUNTER — APPOINTMENT (OUTPATIENT)
Dept: ORTHOPEDIC SURGERY | Facility: CLINIC | Age: 69
End: 2024-10-16
Payer: COMMERCIAL

## 2024-10-16 DIAGNOSIS — M18.9 OSTEOARTHRITIS OF FIRST CARPOMETACARPAL JOINT, UNSPECIFIED: ICD-10-CM

## 2024-10-16 PROCEDURE — 20600 DRAIN/INJ JOINT/BURSA W/O US: CPT | Mod: LT

## 2024-10-16 PROCEDURE — 99213 OFFICE O/P EST LOW 20 MIN: CPT | Mod: 25

## 2024-10-18 ENCOUNTER — TRANSCRIPTION ENCOUNTER (OUTPATIENT)
Age: 69
End: 2024-10-18

## 2024-10-23 ENCOUNTER — APPOINTMENT (OUTPATIENT)
Dept: ORTHOPEDIC SURGERY | Facility: CLINIC | Age: 69
End: 2024-10-23

## 2025-01-31 RX ORDER — DICLOFENAC SODIUM 10 MG/G
1 GEL TOPICAL DAILY
Qty: 1 | Refills: 0 | Status: ACTIVE | COMMUNITY
Start: 2025-01-31 | End: 1900-01-01

## 2025-04-09 ENCOUNTER — APPOINTMENT (OUTPATIENT)
Dept: ORTHOPEDIC SURGERY | Facility: CLINIC | Age: 70
End: 2025-04-09
Payer: COMMERCIAL

## 2025-04-09 DIAGNOSIS — M18.9 OSTEOARTHRITIS OF FIRST CARPOMETACARPAL JOINT, UNSPECIFIED: ICD-10-CM

## 2025-04-09 PROCEDURE — 20550 NJX 1 TENDON SHEATH/LIGAMENT: CPT

## 2025-04-09 PROCEDURE — 99214 OFFICE O/P EST MOD 30 MIN: CPT | Mod: 25

## 2025-07-05 ENCOUNTER — NON-APPOINTMENT (OUTPATIENT)
Age: 70
End: 2025-07-05

## 2025-07-07 ENCOUNTER — APPOINTMENT (OUTPATIENT)
Dept: ORTHOPEDIC SURGERY | Facility: CLINIC | Age: 70
End: 2025-07-07
Payer: COMMERCIAL

## 2025-07-07 ENCOUNTER — NON-APPOINTMENT (OUTPATIENT)
Age: 70
End: 2025-07-07

## 2025-07-07 VITALS — HEIGHT: 67 IN | BODY MASS INDEX: 29.03 KG/M2 | WEIGHT: 185 LBS

## 2025-07-07 PROBLEM — T84.84XA PAIN DUE TO TOTAL LEFT KNEE REPLACEMENT, INITIAL ENCOUNTER: Status: ACTIVE | Noted: 2025-07-07

## 2025-07-07 PROCEDURE — 99214 OFFICE O/P EST MOD 30 MIN: CPT

## 2025-07-07 RX ORDER — METHYLPREDNISOLONE 4 MG/1
4 TABLET ORAL
Qty: 1 | Refills: 4 | Status: ACTIVE | COMMUNITY
Start: 2025-07-07 | End: 1900-01-01

## 2025-07-08 ENCOUNTER — APPOINTMENT (OUTPATIENT)
Dept: ORTHOPEDIC SURGERY | Facility: CLINIC | Age: 70
End: 2025-07-08

## 2025-07-08 PROCEDURE — 99214 OFFICE O/P EST MOD 30 MIN: CPT

## 2025-07-08 RX ORDER — CYCLOBENZAPRINE HYDROCHLORIDE 5 MG/1
5 TABLET, FILM COATED ORAL 3 TIMES DAILY
Qty: 42 | Refills: 1 | Status: ACTIVE | COMMUNITY
Start: 2025-07-08 | End: 1900-01-01

## 2025-08-25 ENCOUNTER — TRANSCRIPTION ENCOUNTER (OUTPATIENT)
Age: 70
End: 2025-08-25

## 2025-08-28 ENCOUNTER — APPOINTMENT (OUTPATIENT)
Dept: ORTHOPEDIC SURGERY | Facility: CLINIC | Age: 70
End: 2025-08-28
Payer: COMMERCIAL

## 2025-08-28 DIAGNOSIS — Z96.652 PAIN DUE TO INTERNAL ORTHOPEDIC PROSTHETIC DEVICES, IMPLANTS AND GRAFTS, INITIAL ENCOUNTER: ICD-10-CM

## 2025-08-28 DIAGNOSIS — T84.84XA PAIN DUE TO INTERNAL ORTHOPEDIC PROSTHETIC DEVICES, IMPLANTS AND GRAFTS, INITIAL ENCOUNTER: ICD-10-CM

## 2025-08-28 PROCEDURE — 99214 OFFICE O/P EST MOD 30 MIN: CPT

## 2025-08-28 RX ORDER — METHYLPREDNISOLONE 4 MG/1
4 TABLET ORAL
Qty: 1 | Refills: 4 | Status: ACTIVE | COMMUNITY
Start: 2025-08-28 | End: 1900-01-01